# Patient Record
Sex: FEMALE | Race: WHITE | NOT HISPANIC OR LATINO | Employment: UNEMPLOYED | RURAL
[De-identification: names, ages, dates, MRNs, and addresses within clinical notes are randomized per-mention and may not be internally consistent; named-entity substitution may affect disease eponyms.]

---

## 2017-01-11 ENCOUNTER — OFFICE VISIT (OUTPATIENT)
Dept: RETAIL CLINIC | Facility: CLINIC | Age: 37
End: 2017-01-11

## 2017-01-11 VITALS
HEIGHT: 63 IN | BODY MASS INDEX: 18.61 KG/M2 | RESPIRATION RATE: 16 BRPM | TEMPERATURE: 98.5 F | WEIGHT: 105 LBS | OXYGEN SATURATION: 95 %

## 2017-01-11 DIAGNOSIS — J01.00 ACUTE NON-RECURRENT MAXILLARY SINUSITIS: Primary | ICD-10-CM

## 2017-01-11 PROCEDURE — 99213 OFFICE O/P EST LOW 20 MIN: CPT | Performed by: NURSE PRACTITIONER

## 2017-01-11 RX ORDER — AZITHROMYCIN 250 MG/1
TABLET, FILM COATED ORAL
Qty: 6 TABLET | Refills: 0 | Status: SHIPPED | OUTPATIENT
Start: 2017-01-11 | End: 2017-04-06

## 2017-01-11 NOTE — PROGRESS NOTES
"Subjective   Donna London is a 36 y.o. female.   Visit Vitals   • Temp 98.5 °F (36.9 °C)   • Resp 16   • Ht 63\" (160 cm)   • Wt 105 lb (47.6 kg)   • SpO2 95%   • BMI 18.6 kg/m2         URI    This is a new problem. The current episode started in the past 7 days. The problem has been rapidly worsening. The maximum temperature recorded prior to her arrival was 101 - 101.9 F. Associated symptoms include chest pain, congestion, coughing (productive), headaches, a plugged ear sensation, rhinorrhea, sinus pain, a sore throat and wheezing (severe). Pertinent negatives include no diarrhea, dysuria, nausea or rash.      PMH of COPD and recurrent bronchitis.      The following portions of the patient's history were reviewed and updated as appropriate: allergies, current medications, past family history, past medical history, past social history, past surgical history and problem list.    Review of Systems   HENT: Positive for congestion, rhinorrhea and sore throat.    Respiratory: Positive for cough (productive) and wheezing (severe).    Cardiovascular: Positive for chest pain.   Gastrointestinal: Negative for diarrhea and nausea.   Genitourinary: Negative for dysuria.   Skin: Negative for rash.   Neurological: Positive for headaches.       Objective   Physical Exam   Constitutional: She appears well-developed and well-nourished. She appears ill.   HENT:   Head: Normocephalic and atraumatic.   Right Ear: Tympanic membrane and ear canal normal.   Left Ear: Tympanic membrane and ear canal normal.   Nose: Mucosal edema and rhinorrhea present. Right sinus exhibits maxillary sinus tenderness. Left sinus exhibits maxillary sinus tenderness.   Mouth/Throat: No posterior oropharyngeal edema or posterior oropharyngeal erythema.   Tongue is slighted coated   Cardiovascular: Regular rhythm and normal heart sounds.    Pulmonary/Chest: Effort normal. She has no wheezes. She has no rhonchi. She has no rales.   Lymphadenopathy:     She " has no cervical adenopathy.   Skin: Skin is warm and dry.       Assessment/Plan   Diagnoses and all orders for this visit:    Acute non-recurrent maxillary sinusitis    Other orders  -     azithromycin (ZITHROMAX Z-SHOAIB) 250 MG tablet; Take 2 tablets the first day, then 1 tablet daily for 4 days.

## 2017-04-06 ENCOUNTER — OFFICE VISIT (OUTPATIENT)
Dept: RETAIL CLINIC | Facility: CLINIC | Age: 37
End: 2017-04-06

## 2017-04-06 VITALS
HEART RATE: 74 BPM | SYSTOLIC BLOOD PRESSURE: 105 MMHG | TEMPERATURE: 98.5 F | DIASTOLIC BLOOD PRESSURE: 70 MMHG | OXYGEN SATURATION: 99 % | WEIGHT: 103 LBS | BODY MASS INDEX: 18.25 KG/M2 | HEIGHT: 63 IN

## 2017-04-06 DIAGNOSIS — K04.7 DENTAL ABSCESS: Primary | ICD-10-CM

## 2017-04-06 DIAGNOSIS — J06.9 UPPER RESPIRATORY INFECTION, ACUTE: ICD-10-CM

## 2017-04-06 PROCEDURE — 99213 OFFICE O/P EST LOW 20 MIN: CPT | Performed by: NURSE PRACTITIONER

## 2017-04-06 RX ORDER — ALBUTEROL SULFATE 90 UG/1
2 AEROSOL, METERED RESPIRATORY (INHALATION) EVERY 4 HOURS PRN
Qty: 1 INHALER | Refills: 0 | Status: SHIPPED | OUTPATIENT
Start: 2017-04-06 | End: 2019-06-26

## 2017-04-06 RX ORDER — CLARITHROMYCIN 500 MG/1
500 TABLET, COATED ORAL 2 TIMES DAILY
Qty: 20 TABLET | Refills: 0 | Status: SHIPPED | OUTPATIENT
Start: 2017-04-06 | End: 2017-04-16

## 2017-04-06 RX ORDER — FLUTICASONE PROPIONATE 50 MCG
2 SPRAY, SUSPENSION (ML) NASAL DAILY
Qty: 1 EACH | Refills: 0 | Status: SHIPPED | OUTPATIENT
Start: 2017-04-06 | End: 2017-05-06

## 2017-04-06 NOTE — PROGRESS NOTES
Subjective   Donna London is a 36 y.o. female presents with the following:    Earache    There is pain in the left ear. This is a new problem. The current episode started yesterday. The problem occurs constantly. The problem has been gradually worsening. There has been no fever. The pain is moderate. Associated symptoms include coughing and headaches. Pertinent negatives include no ear discharge, rhinorrhea or sore throat. She has tried acetaminophen and NSAIDs for the symptoms. The treatment provided mild relief.   Dental Pain    This is a recurrent problem. The current episode started in the past 7 days. The problem occurs constantly. The problem has been gradually worsening. The pain is moderate. Associated symptoms include facial pain and sinus pressure. She has tried acetaminophen and NSAIDs for the symptoms. The treatment provided mild relief.   Patient recently in the clinic a couple months ago for a dental abcess. She reports that she has had dental work performed since that visit. She is scheduled for additional dental work on 04/17.    The following portions of the patient's history were reviewed and updated as appropriate: allergies, current medications, past family history, past medical history, past social history, past surgical history and problem list.    Review of Systems   Constitutional: Positive for appetite change.   HENT: Positive for congestion, dental problem (left upper molar), ear pain, postnasal drip, sinus pressure and tinnitus. Negative for ear discharge, rhinorrhea, sneezing and sore throat.    Eyes: Negative.    Respiratory: Positive for cough, shortness of breath and wheezing.    Cardiovascular: Negative.    Gastrointestinal: Negative.    Skin: Negative.    Neurological: Positive for headaches.   Psychiatric/Behavioral: The patient is nervous/anxious.        Objective   Physical Exam   Constitutional: She is oriented to person, place, and time. She appears well-developed and  well-nourished.   HENT:   Head: Normocephalic and atraumatic.   Right Ear: Hearing, tympanic membrane, external ear and ear canal normal.   Left Ear: Hearing, external ear and ear canal normal. A middle ear effusion is present.   Nose: Nose normal.   Mouth/Throat: Uvula is midline and mucous membranes are normal. Posterior oropharyngeal erythema present.   Eyes: Pupils are equal, round, and reactive to light.   Neck: Normal range of motion.   Cardiovascular: Normal rate, regular rhythm and normal heart sounds.    No murmur heard.  Pulmonary/Chest: Effort normal. She has decreased breath sounds in the right lower field and the left lower field. She has wheezes in the right upper field and the right middle field.   Musculoskeletal: Normal range of motion.   Neurological: She is alert and oriented to person, place, and time.   Skin: Skin is warm and dry.   Vitals reviewed.      Assessment/Plan   Donna was seen today for earache and dental pain.    Diagnoses and all orders for this visit:    Dental abscess  -     clarithromycin (BIAXIN) 500 MG tablet; Take 1 tablet by mouth 2 (Two) Times a Day for 10 days.    Upper respiratory infection, acute  -     fluticasone (FLONASE) 50 MCG/ACT nasal spray; 2 sprays into each nostril Daily for 30 days.  -     albuterol (PROVENTIL HFA;VENTOLIN HFA) 108 (90 BASE) MCG/ACT inhaler; Inhale 2 puffs Every 4 (Four) Hours As Needed for Wheezing or Shortness of Air.       Reviewed home care instructions, and patient verbalized understanding. Patient instructed to follow up with PCP and/or ED for worsening or non-resolving symptoms.     JAN Zhang

## 2017-04-06 NOTE — PATIENT INSTRUCTIONS
"Upper Respiratory Infection, Adult  Most upper respiratory infections (URIs) are a viral infection of the air passages leading to the lungs. A URI affects the nose, throat, and upper air passages. The most common type of URI is nasopharyngitis and is typically referred to as \"the common cold.\"  URIs run their course and usually go away on their own. Most of the time, a URI does not require medical attention, but sometimes a bacterial infection in the upper airways can follow a viral infection. This is called a secondary infection. Sinus and middle ear infections are common types of secondary upper respiratory infections.  Bacterial pneumonia can also complicate a URI. A URI can worsen asthma and chronic obstructive pulmonary disease (COPD). Sometimes, these complications can require emergency medical care and may be life threatening.   CAUSES  Almost all URIs are caused by viruses. A virus is a type of germ and can spread from one person to another.   RISKS FACTORS  You may be at risk for a URI if:   · You smoke.    · You have chronic heart or lung disease.  · You have a weakened defense (immune) system.    · You are very young or very old.    · You have nasal allergies or asthma.  · You work in crowded or poorly ventilated areas.  · You work in health care facilities or schools.  SIGNS AND SYMPTOMS   Symptoms typically develop 2-3 days after you come in contact with a cold virus. Most viral URIs last 7-10 days. However, viral URIs from the influenza virus (flu virus) can last 14-18 days and are typically more severe. Symptoms may include:   · Runny or stuffy (congested) nose.    · Sneezing.    · Cough.    · Sore throat.    · Headache.    · Fatigue.    · Fever.    · Loss of appetite.    · Pain in your forehead, behind your eyes, and over your cheekbones (sinus pain).  · Muscle aches.    DIAGNOSIS   Your health care provider may diagnose a URI by:  · Physical exam.  · Tests to check that your symptoms are not due to " another condition such as:  ¨ Strep throat.  ¨ Sinusitis.  ¨ Pneumonia.  ¨ Asthma.  TREATMENT   A URI goes away on its own with time. It cannot be cured with medicines, but medicines may be prescribed or recommended to relieve symptoms. Medicines may help:  · Reduce your fever.  · Reduce your cough.  · Relieve nasal congestion.  HOME CARE INSTRUCTIONS   · Take medicines only as directed by your health care provider.    · Gargle warm saltwater or take cough drops to comfort your throat as directed by your health care provider.  · Use a warm mist humidifier or inhale steam from a shower to increase air moisture. This may make it easier to breathe.  · Drink enough fluid to keep your urine clear or pale yellow.    · Eat soups and other clear broths and maintain good nutrition.    · Rest as needed.    · Return to work when your temperature has returned to normal or as your health care provider advises. You may need to stay home longer to avoid infecting others. You can also use a face mask and careful hand washing to prevent spread of the virus.  · Increase the usage of your inhaler if you have asthma.    · Do not use any tobacco products, including cigarettes, chewing tobacco, or electronic cigarettes. If you need help quitting, ask your health care provider.  PREVENTION   The best way to protect yourself from getting a cold is to practice good hygiene.   · Avoid oral or hand contact with people with cold symptoms.    · Wash your hands often if contact occurs.    There is no clear evidence that vitamin C, vitamin E, echinacea, or exercise reduces the chance of developing a cold. However, it is always recommended to get plenty of rest, exercise, and practice good nutrition.   SEEK MEDICAL CARE IF:   · You are getting worse rather than better.    · Your symptoms are not controlled by medicine.    · You have chills.  · You have worsening shortness of breath.  · You have brown or red mucus.  · You have yellow or brown nasal  discharge.  · You have pain in your face, especially when you bend forward.  · You have a fever.  · You have swollen neck glands.  · You have pain while swallowing.  · You have white areas in the back of your throat.  SEEK IMMEDIATE MEDICAL CARE IF:   · You have severe or persistent:    Headache.    Ear pain.    Sinus pain.    Chest pain.  · You have chronic lung disease and any of the following:    Wheezing.    Prolonged cough.    Coughing up blood.    A change in your usual mucus.  · You have a stiff neck.  · You have changes in your:    Vision.    Hearing.    Thinking.    Mood.  MAKE SURE YOU:   · Understand these instructions.  · Will watch your condition.  · Will get help right away if you are not doing well or get worse.     This information is not intended to replace advice given to you by your health care provider. Make sure you discuss any questions you have with your health care provider.     Document Released: 06/13/2002 Document Revised: 05/03/2016 Document Reviewed: 03/25/2015  [employer] Interactive Patient Education ©2016 [employer]  Dental Abscess  A dental abscess is a collection of pus in or around a tooth.  CAUSES  This condition is caused by a bacterial infection around the root of the tooth that involves the inner part of the tooth (pulp). It may result from:  · Severe tooth decay.  · Trauma to the tooth that allows bacteria to enter into the pulp, such as a broken or chipped tooth.  · Severe gum disease around a tooth.  SYMPTOMS  Symptoms of this condition include:  · Severe pain in and around the infected tooth.  · Swelling and redness around the infected tooth, in the mouth, or in the face.  · Tenderness.  · Pus drainage.  · Bad breath.  · Bitter taste in the mouth.  · Difficulty swallowing.  · Difficulty opening the mouth.  · Nausea.  · Vomiting.  · Chills.  · Swollen neck glands.  · Fever.  DIAGNOSIS  This condition is diagnosed with examination of the infected tooth. During the exam, your  dentist may tap on the infected tooth. Your dentist will also ask about your medical and dental history and may order X-rays.  TREATMENT  This condition is treated by eliminating the infection. This may be done with:  · Antibiotic medicine.  · A root canal. This may be performed to save the tooth.  · Pulling (extracting) the tooth. This may also involve draining the abscess. This is done if the tooth cannot be saved.  HOME CARE INSTRUCTIONS  · Take medicines only as directed by your dentist.  · If you were prescribed antibiotic medicine, finish all of it even if you start to feel better.  · Rinse your mouth (gargle) often with salt water to relieve pain or swelling.  · Do not drive or operate heavy machinery while taking pain medicine.  · Do not apply heat to the outside of your mouth.  · Keep all follow-up visits as directed by your dentist. This is important.  SEEK MEDICAL CARE IF:  · Your pain is worse and is not helped by medicine.  SEEK IMMEDIATE MEDICAL CARE IF:  · You have a fever or chills.  · Your symptoms suddenly get worse.  · You have a very bad headache.  · You have problems breathing or swallowing.  · You have trouble opening your mouth.  · You have swelling in your neck or around your eye.     This information is not intended to replace advice given to you by your health care provider. Make sure you discuss any questions you have with your health care provider.     Document Released: 12/18/2006 Document Revised: 05/03/2016 Document Reviewed: 12/15/2015  [employer] Interactive Patient Education ©2016 [employer]

## 2017-09-08 ENCOUNTER — OFFICE VISIT (OUTPATIENT)
Dept: RETAIL CLINIC | Facility: CLINIC | Age: 37
End: 2017-09-08

## 2017-09-08 VITALS
BODY MASS INDEX: 17.72 KG/M2 | HEIGHT: 63 IN | TEMPERATURE: 98.6 F | HEART RATE: 72 BPM | RESPIRATION RATE: 20 BRPM | OXYGEN SATURATION: 99 % | WEIGHT: 100 LBS

## 2017-09-08 DIAGNOSIS — M54.2 NECK PAIN, MUSCULOSKELETAL: Primary | ICD-10-CM

## 2017-09-08 DIAGNOSIS — H92.02 OTALGIA, LEFT: ICD-10-CM

## 2017-09-08 PROCEDURE — 99213 OFFICE O/P EST LOW 20 MIN: CPT | Performed by: NURSE PRACTITIONER

## 2017-09-08 RX ORDER — LORATADINE 10 MG/1
10 TABLET ORAL DAILY
Qty: 30 TABLET | Refills: 0 | Status: SHIPPED | OUTPATIENT
Start: 2017-09-08 | End: 2017-10-08

## 2017-09-08 RX ORDER — IBUPROFEN 600 MG/1
600 TABLET ORAL EVERY 8 HOURS PRN
Qty: 30 TABLET | Refills: 0 | Status: SHIPPED | OUTPATIENT
Start: 2017-09-08 | End: 2017-09-18

## 2017-09-08 NOTE — PATIENT INSTRUCTIONS
Muscle Pain, Adult  Muscle pain (myalgia) may be caused by many things, including:  · Overuse or muscle strain, especially if you are not in shape. This is the most common cause of muscle pain.  · Injury.  · Bruises.  · Viruses, such as the flu.  · Infectious diseases.  · Fibromyalgia, which is a chronic condition that causes muscle tenderness, fatigue, and headache.  · Autoimmune diseases, including lupus.  · Certain drugs, including ACE inhibitors and statins.  Muscle pain may be mild or severe. In most cases, the pain lasts only a short time and goes away without treatment. To diagnose the cause of your muscle pain, your health care provider will take your medical history. This means he or she will ask you when your muscle pain began and what has been happening. If you have not had muscle pain for very long, your health care provider may want to wait before doing much testing. If your muscle pain has lasted a long time, your health care provider may want to run tests right away. If your health care provider thinks your muscle pain may be caused by illness, you may need to have additional tests to rule out certain conditions.   Treatment for muscle pain depends on the cause. Home care is often enough to relieve muscle pain. Your health care provider may also prescribe anti-inflammatory medicine.  HOME CARE INSTRUCTIONS  Watch your condition for any changes. The following actions may help to lessen any discomfort you are feeling:  · Only take over-the-counter or prescription medicines as directed by your health care provider.  · Apply ice to the sore muscle:    Put ice in a plastic bag.    Place a towel between your skin and the bag.    Leave the ice on for 15-20 minutes, 3-4 times a day.  · You may alternate applying hot and cold packs to the muscle as directed by your health care provider.  · If overuse is causing your muscle pain, slow down your activities until the pain goes away.    Remember that it is normal  to feel some muscle pain after starting a workout program. Muscles that have not been used often will be sore at first.    Do regular, gentle exercises if you are not usually active.    Warm up before exercising to lower your risk of muscle pain.  · Do not continue working out if the pain is very bad. Bad pain could mean you have injured a muscle.  SEEK MEDICAL CARE IF:  · Your muscle pain gets worse, and medicines do not help.  · You have muscle pain that lasts longer than 3 days.  · You have a rash or fever along with muscle pain.  · You have muscle pain after a tick bite.  · You have muscle pain while working out, even though you are in good physical condition.  · You have redness, soreness, or swelling along with muscle pain.  · You have muscle pain after starting a new medicine or changing the dose of a medicine.  SEEK IMMEDIATE MEDICAL CARE IF:  · You have trouble breathing.  · You have trouble swallowing.  · You have muscle pain along with a stiff neck, fever, and vomiting.  · You have severe muscle weakness or cannot move part of your body.  MAKE SURE YOU:   · Understand these instructions.  · Will watch your condition.  · Will get help right away if you are not doing well or get worse.     This information is not intended to replace advice given to you by your health care provider. Make sure you discuss any questions you have with your health care provider.     Document Released: 11/09/2007 Document Revised: 04/10/2017 Document Reviewed: 10/14/2014  [employer] Interactive Patient Education ©2017 [employer]  Earache  An earache, also called otalgia, can be caused by many things. Pain from an earache can be sharp, dull, or burning. The pain may be temporary or constant.  Earaches can be caused by problems with the ear, such as infection in either the middle ear or the ear canal, injury, impacted ear wax, middle ear pressure, or a foreign body in the ear. Ear pain can also result from problems in other areas.  This is called referred pain. For example, pain can come from a sore throat, a tooth infection, or problems with the jaw or the joint between the jaw and the skull (temporomandibular joint, or TMJ).  The cause of an earache is not always easy to identify. Watchful waiting may be appropriate for some earaches until a clear cause of the pain can be found.  HOME CARE INSTRUCTIONS  Watch your condition for any changes. The following actions may help to lessen any discomfort that you are feeling:  · Take medicines only as directed by your health care provider. This includes ear drops.  · Apply ice to your outer ear to help reduce pain.    Put ice in a plastic bag.    Place a towel between your skin and the bag.    Leave the ice on for 20 minutes, 2-3 times per day.  · Do not put anything in your ear other than medicine that is prescribed by your health care provider.  · Try resting in an upright position instead of lying down. This may help to reduce pressure in the middle ear and relieve pain.  · Chew gum if it helps to relieve your ear pain.  · Control any allergies that you have.  · Keep all follow-up visits as directed by your health care provider. This is important.  SEEK MEDICAL CARE IF:  · Your pain does not improve within 2 days.  · You have a fever.  · You have new or worsening symptoms.  SEEK IMMEDIATE MEDICAL CARE IF:  · You have a severe headache.  · You have a stiff neck.  · You have difficulty swallowing.  · You have redness or swelling behind your ear.  · You have drainage from your ear.  · You have hearing loss.  · You feel dizzy.     This information is not intended to replace advice given to you by your health care provider. Make sure you discuss any questions you have with your health care provider.      You Can Quit Smoking  If you are ready to quit smoking or are thinking about it, congratulations! You have chosen to help yourself be healthier and live longer! There are lots of different ways to quit  "smoking. Nicotine gum, nicotine patches, a nicotine inhaler, or nicotine nasal spray can help with physical craving. Hypnosis, support groups, and medicines help break the habit of smoking.  TIPS TO GET OFF AND STAY OFF CIGARETTES  · Learn to predict your moods. Do not let a bad situation be your excuse to have a cigarette. Some situations in your life might tempt you to have a cigarette.  · Ask friends and co-workers not to smoke around you.  · Make your home smoke-free.  · Never have \"just one\" cigarette. It leads to wanting another and another. Remind yourself of your decision to quit.  · On a card, make a list of your reasons for not smoking. Read it at least the same number of times a day as you have a cigarette. Tell yourself everyday, \"I do not want to smoke. I choose not to smoke.\"  · Ask someone at home or work to help you with your plan to quit smoking.  · Have something planned after you eat or have a cup of coffee. Take a walk or get other exercise to perk you up. This will help to keep you from overeating.  · Try a relaxation exercise to calm you down and decrease your stress. Remember, you may be tense and nervous the first two weeks after you quit. This will pass.  · Find new activities to keep your hands busy. Play with a pen, coin, or rubber band. Doodle or draw things on paper.  · Brush your teeth right after eating. This will help cut down the craving for the taste of tobacco after meals. You can try mouthwash too.  · Try gum, breath mints, or diet candy to keep something in your mouth.  IF YOU SMOKE AND WANT TO QUIT:  · Do not stock up on cigarettes. Never buy a carton. Wait until one pack is finished before you buy another.  · Never carry cigarettes with you at work or at home.  · Keep cigarettes as far away from you as possible. Leave them with someone else.  · Never carry matches or a lighter with you.  · Ask yourself, \"Do I need this cigarette or is this just a reflex?\"  · Bet with someone " that you can quit. Put cigarette money in a [employer] bank every morning. If you smoke, you give up the money. If you do not smoke, by the end of the week, you keep the money.  · Keep trying. It takes 21 days to change a habit!  · Talk to your doctor about using medicines to help you quit. These include nicotine replacement gum, lozenges, or skin patches.     This information is not intended to replace advice given to you by your health care provider. Make sure you discuss any questions you have with your health care provider.     Document Released: 10/14/2010 Document Revised: 03/11/2013 Document Reviewed: 05/03/2016  [employer] Interactive Patient Education ©2017 [employer]       Document Released: 08/04/2005 Document Revised: 01/08/2016 Document Reviewed: 07/19/2015  [employer] Interactive Patient Education ©2017 [employer]

## 2017-09-08 NOTE — PROGRESS NOTES
Subjective   Donna London is a 37 y.o. female presents for left ear pain/fullness and neck pain/pressure.  States that neck muscle stiffness and ear problem has been ongoing intermittently for about one year.  States worse in last couple of days.   has a history of TMJ.   has been really stressed and had dental work yesterday.  Thinks stress is causing neck to hurt from tension.  States ear popping and feels like fluid.   has doxycycline for ear that she has been taking but doesn't seem to be helping.     Ear Fullness    There is pain in the left ear. This is a recurrent problem. The current episode started more than 1 month ago. The problem has been waxing and waning. There has been no fever. The pain is at a severity of 4/10. Associated symptoms include headaches (intermittent ) and neck pain. Pertinent negatives include no abdominal pain, coughing, diarrhea, ear discharge, hearing loss, rhinorrhea or sore throat. She has tried antibiotics (advil ) for the symptoms. The treatment provided mild relief.   Neck Pain    This is a recurrent problem. The current episode started more than 1 month ago. The problem has been waxing and waning. Associated with: stress. Pain location: upper back/lower neck. The quality of the pain is described as aching. The pain is at a severity of 4/10. Associated symptoms include headaches (intermittent ). Pertinent negatives include no chest pain, fever, leg pain, numbness, pain with swallowing, paresis, photophobia, syncope, tingling, trouble swallowing, visual change, weakness or weight loss. She has tried NSAIDs for the symptoms. The treatment provided mild relief.        The following portions of the patient's history were reviewed and updated as appropriate: allergies, current medications, past family history, past medical history, past social history and past surgical history.    Review of Systems   Constitutional: Negative for chills, fever, unexpected weight  change and weight loss.   HENT: Positive for dental problem (states that she had several teeth filled yesterday,  denies pain or drainage) and ear pain (bilat ear pressure and pain). Negative for congestion, drooling, ear discharge, hearing loss, postnasal drip, rhinorrhea, sinus pressure, sneezing, sore throat, trouble swallowing and voice change.    Eyes: Negative.  Negative for photophobia.   Respiratory: Negative.  Negative for cough, chest tightness and wheezing.    Cardiovascular: Negative.  Negative for chest pain and syncope.   Gastrointestinal: Negative.  Negative for abdominal pain and diarrhea.   Genitourinary: Negative.    Musculoskeletal: Positive for neck pain and neck stiffness (thinks muscle stiffness and tension related to TMJ, dental work and stress).   Skin: Negative.    Allergic/Immunologic: Negative.    Neurological: Positive for headaches (intermittent ). Negative for tingling, syncope, weakness, light-headedness and numbness.       Objective   Physical Exam   Constitutional: She is oriented to person, place, and time. She appears well-developed and well-nourished. No distress.   HENT:   Head: Normocephalic and atraumatic.   Right Ear: A middle ear effusion is present.   Left Ear: A middle ear effusion is present.   Nose: Mucosal edema present.   Mouth/Throat: Uvula is midline, oropharynx is clear and moist and mucous membranes are normal. Tonsils are 0 on the right. Tonsils are 0 on the left. No tonsillar exudate.   Eyes: Lids are normal. Pupils are equal, round, and reactive to light.   Neck: Normal range of motion. Muscular tenderness present. No edema and no erythema present.       Cardiovascular: Normal rate, regular rhythm and normal heart sounds.    No murmur heard.  Pulmonary/Chest: Effort normal and breath sounds normal. No respiratory distress.   Lymphadenopathy:     She has no cervical adenopathy.   Neurological: She is alert and oriented to person, place, and time.   Skin: Skin is  warm and dry.   Psychiatric: She has a normal mood and affect. Her speech is normal and behavior is normal. Thought content normal.       Assessment/Plan   Donna was seen today for ear fullness and neck pain.    Diagnoses and all orders for this visit:    Neck pain, musculoskeletal  Otalgia, left  -     loratadine (CLARITIN) 10 MG tablet; Take 1 tablet by mouth Daily for 30 days.  -     ibuprofen (ADVIL,MOTRIN) 600 MG tablet; Take 1 tablet by mouth Every 8 (Eight) Hours As Needed for Mild Pain  or Moderate Pain  for up to 10 days.      Medications and plan of care reviewed with patient and teaching done on med reactions/side effects.  Rest, plenty of fluids, comfort measures, heating pad to neck as discussed.  Take Claritin and Ibuprofen as discussed.  Encouraged to quit smoking and to utilize resources as discussed.  If symptoms persist follow up with PCP and if worse go to urgent care or ER as discussed.  Patient verbalized understanding.    JAN Hinton

## 2017-11-13 ENCOUNTER — HOSPITAL ENCOUNTER (OUTPATIENT)
Dept: LAB | Facility: MEDICAL CENTER | Age: 37
End: 2017-11-13
Attending: FAMILY MEDICINE
Payer: COMMERCIAL

## 2017-11-13 LAB
HIV 1+2 AB+HIV1 P24 AG SERPL QL IA: NON REACTIVE
TREPONEMA PALLIDUM IGG+IGM AB [PRESENCE] IN SERUM OR PLASMA BY IMMUNOASSAY: NON REACTIVE

## 2017-11-13 PROCEDURE — 36415 COLL VENOUS BLD VENIPUNCTURE: CPT

## 2017-11-13 PROCEDURE — 87389 HIV-1 AG W/HIV-1&-2 AB AG IA: CPT

## 2017-11-13 PROCEDURE — 86780 TREPONEMA PALLIDUM: CPT

## 2018-03-13 DIAGNOSIS — Z01.812 PRE-OPERATIVE LABORATORY EXAMINATION: ICD-10-CM

## 2018-03-13 PROCEDURE — 83036 HEMOGLOBIN GLYCOSYLATED A1C: CPT

## 2018-03-13 PROCEDURE — 84703 CHORIONIC GONADOTROPIN ASSAY: CPT

## 2018-03-13 PROCEDURE — 36415 COLL VENOUS BLD VENIPUNCTURE: CPT

## 2018-03-13 NOTE — OR NURSING
PreAdmit Appointment: Patient given Preparing for your procedure handout. Patient instructed to continue regularly prescribed medications through day before surgery. Instructed to take the following medications the day of surgery with a sip of water per Anesthesia protocol: None

## 2018-03-14 ENCOUNTER — HOSPITAL ENCOUNTER (OUTPATIENT)
Facility: MEDICAL CENTER | Age: 38
End: 2018-03-14
Attending: ORTHOPAEDIC SURGERY | Admitting: ORTHOPAEDIC SURGERY
Payer: COMMERCIAL

## 2018-03-14 VITALS
DIASTOLIC BLOOD PRESSURE: 77 MMHG | OXYGEN SATURATION: 98 % | SYSTOLIC BLOOD PRESSURE: 113 MMHG | TEMPERATURE: 97.9 F | BODY MASS INDEX: 31.45 KG/M2 | HEART RATE: 76 BPM | RESPIRATION RATE: 16 BRPM | WEIGHT: 200.4 LBS | HEIGHT: 67 IN

## 2018-03-14 DIAGNOSIS — G56.01 RIGHT CARPAL TUNNEL SYNDROME: ICD-10-CM

## 2018-03-14 LAB
EST. AVERAGE GLUCOSE BLD GHB EST-MCNC: 120 MG/DL
GLUCOSE BLD-MCNC: 106 MG/DL (ref 65–99)
HBA1C MFR BLD: 5.8 % (ref 0–5.6)
HCG SERPL QL: NEGATIVE

## 2018-03-14 PROCEDURE — 160029 HCHG SURGERY MINUTES - 1ST 30 MINS LEVEL 4: Performed by: ORTHOPAEDIC SURGERY

## 2018-03-14 PROCEDURE — 160025 RECOVERY II MINUTES (STATS): Performed by: ORTHOPAEDIC SURGERY

## 2018-03-14 PROCEDURE — 82962 GLUCOSE BLOOD TEST: CPT

## 2018-03-14 PROCEDURE — 501838 HCHG SUTURE GENERAL: Performed by: ORTHOPAEDIC SURGERY

## 2018-03-14 PROCEDURE — 160048 HCHG OR STATISTICAL LEVEL 1-5: Performed by: ORTHOPAEDIC SURGERY

## 2018-03-14 PROCEDURE — 700111 HCHG RX REV CODE 636 W/ 250 OVERRIDE (IP)

## 2018-03-14 PROCEDURE — 700101 HCHG RX REV CODE 250

## 2018-03-14 PROCEDURE — 700105 HCHG RX REV CODE 258: Performed by: ORTHOPAEDIC SURGERY

## 2018-03-14 PROCEDURE — 160009 HCHG ANES TIME/MIN: Performed by: ORTHOPAEDIC SURGERY

## 2018-03-14 PROCEDURE — 502576 HCHG PACK, HAND: Performed by: ORTHOPAEDIC SURGERY

## 2018-03-14 PROCEDURE — 160046 HCHG PACU - 1ST 60 MINS PHASE II: Performed by: ORTHOPAEDIC SURGERY

## 2018-03-14 PROCEDURE — A6454 SELF-ADHER BAND W>=3" <5"/YD: HCPCS | Performed by: ORTHOPAEDIC SURGERY

## 2018-03-14 RX ORDER — SODIUM CHLORIDE, SODIUM LACTATE, POTASSIUM CHLORIDE, CALCIUM CHLORIDE 600; 310; 30; 20 MG/100ML; MG/100ML; MG/100ML; MG/100ML
1000 INJECTION, SOLUTION INTRAVENOUS
Status: DISCONTINUED | OUTPATIENT
Start: 2018-03-14 | End: 2018-03-14 | Stop reason: HOSPADM

## 2018-03-14 RX ORDER — LIDOCAINE HYDROCHLORIDE AND EPINEPHRINE 10; 10 MG/ML; UG/ML
INJECTION, SOLUTION INFILTRATION; PERINEURAL
Status: DISCONTINUED | OUTPATIENT
Start: 2018-03-14 | End: 2018-03-14 | Stop reason: HOSPADM

## 2018-03-14 RX ORDER — TRAMADOL HYDROCHLORIDE 50 MG/1
50 TABLET ORAL EVERY 4 HOURS PRN
Qty: 27 TAB | Refills: 0 | Status: SHIPPED | OUTPATIENT
Start: 2018-03-14 | End: 2018-03-21

## 2018-03-14 RX ORDER — BUPIVACAINE HYDROCHLORIDE AND EPINEPHRINE 5; 5 MG/ML; UG/ML
INJECTION, SOLUTION EPIDURAL; INTRACAUDAL; PERINEURAL
Status: DISCONTINUED | OUTPATIENT
Start: 2018-03-14 | End: 2018-03-14 | Stop reason: HOSPADM

## 2018-03-14 RX ADMIN — SODIUM CHLORIDE, POTASSIUM CHLORIDE, SODIUM LACTATE AND CALCIUM CHLORIDE 1000 ML: 600; 310; 30; 20 INJECTION, SOLUTION INTRAVENOUS at 12:06

## 2018-03-14 ASSESSMENT — PAIN SCALES - GENERAL
PAINLEVEL_OUTOF10: 0

## 2018-03-14 NOTE — OR NURSING
1619 Pt arrival direct from OR meets stage II status criteria. Pt to PACU II from OR via gurney, respirations spontaneous and non-labored via room air. Right handsurgical sites clean, dry and intact, pt awake with no complaints of pain or nausea. Ice pack placed on right hand surgical sight, cap refill < 2 seconds to right fingers.     1630 Pt dressed and transferred to [facility]. Pt meets criteria for D/C home.     1648 Pt mother at bedside.     1655 Provided a copy and educated pt and family on discharge instructions.    1652 Pt discharged via wheelchair.

## 2018-03-14 NOTE — DISCHARGE INSTRUCTIONS
ACTIVITY: Rest and take it easy for the first 24 hours.  A responsible adult is recommended to remain with you during that time.  It is normal to feel sleepy.  We encourage you to not do anything that requires balance, judgment or coordination.    MILD FLU-LIKE SYMPTOMS ARE NORMAL. YOU MAY EXPERIENCE GENERALIZED MUSCLE ACHES, THROAT IRRITATION, HEADACHE AND/OR SOME NAUSEA.    FOR 24 HOURS DO NOT:  Drive, operate machinery or run household appliances.  Drink beer or alcoholic beverages.   Make important decisions or sign legal documents.    SPECIAL INSTRUCTIONS:     Keep dressings clean, dry and intact   No lifting anything heavier than 10 lbs with the operative hand   May remove dressings after 48 hrs   May begin showering and washing hands as normal after 48 hrs   Do not soak the wound   Keep hand elevated and apply ice as much as possible in the first three days   Do not operate a vehicle or machinery while taking narcotic pain medications   Return to clinic in 10-14 days   Please call the office with any questions [phone]    DIET: To avoid nausea, slowly advance diet as tolerated, avoiding spicy or greasy foods for the first day.  Add more substantial food to your diet according to your physician's instructions.  Babies can be fed formula or breast milk as soon as they are hungry.  INCREASE FLUIDS AND FIBER TO AVOID CONSTIPATION.    FOLLOW-UP APPOINTMENT:  A follow-up appointment should be arranged with your doctor in 10-14 days; call to schedule.    You should CALL YOUR PHYSICIAN if you develop:  Fever greater than 101 degrees F.  Pain not relieved by medication, or persistent nausea or vomiting.  Excessive bleeding (blood soaking through dressing) or unexpected drainage from the wound.  Extreme redness or swelling around the incision site, drainage of pus or foul smelling drainage.  Inability to urinate or empty your bladder within 8 hours - return to ER.  Problems with breathing or chest pain - call  911.    You should call 911 if you develop problems with breathing or chest pain.  If you are unable to contact your doctor or surgical center, you should go to the nearest emergency room or urgent care center. Dr. Perez's telephone #: [phone].     If any questions arise, call your doctor.  If your doctor is not available, please feel free to call the Surgical Center at [phone].  The Center is open Monday through Friday from 7AM to 7PM.  You can also call the HEALTH HOTLINE open 24 hours/day, 7 days/week and speak to a nurse at [phone], or toll free at [phone].    A registered nurse may call you a few days after your surgery to see how you are doing after your procedure.    MEDICATIONS: Resume taking daily medication.  Take prescribed pain medication with food.  If no medication is prescribed, you may take non-aspirin pain medication if needed.  PAIN MEDICATION CAN BE VERY CONSTIPATING.  Take a stool softener or laxative such as senokot, pericolace, or milk of magnesia if needed.    Prescription given for Ultram.  Last pain medication given at none given.    If your physician has prescribed pain medication that includes Acetaminophen (Tylenol), do not take additional Acetaminophen (Tylenol) while taking the prescribed medication.    Depression / Suicide Risk    As you are discharged from this [facility] facility, it is important to learn how to keep safe from harming yourself.    Recognize the warning signs:  · Abrupt changes in personality, positive or negative- including increase in energy   · Giving away possessions  · Change in eating patterns- significant weight changes-  positive or negative  · Change in sleeping patterns- unable to sleep or sleeping all the time   · Unwillingness or inability to communicate  · Depression  · Unusual sadness, discouragement and loneliness  · Talk of wanting to die  · Neglect of personal appearance   · Rebelliousness- reckless  behavior  · Withdrawal from people/activities they love  · Confusion- inability to concentrate     If you or a loved one observes any of these behaviors or has concerns about self-harm, here's what you can do:  · Talk about it- your feelings and reasons for harming yourself  · Remove any means that you might use to hurt yourself (examples: pills, rope, extension cords, firearm)  · Get professional help from the community (Mental Health, Substance Abuse, psychological counseling)  · Do not be alone:Call your Safe Contact- someone whom you trust who will be there for you.  · Call your local CRISIS HOTLINE 213-1631 or 817-065-2372  · Call your local Children's Mobile Crisis Response Team [place] (437) 526-1042 or www.[employer]  · Call the toll free National Suicide Prevention Hotlines   · National Suicide Prevention Lifeline 398-876-NSPH (6090)  · National Hope Line Network 800-SUICIDE (672-1146)

## 2018-03-15 NOTE — OP REPORT
DATE OF SURGERY:  3/14/2018     PREOPERATIVE DIAGNOSIS:  Right carpal tunnel syndrome.     POSTOPERATIVE DIAGNOSIS:  Right carpal tunnel syndrome.     SURGERY PERFORMED:  Right endoscopic carpal tunnel release.     SURGEON:  José Miguel Perez MD     ANESTHESIA:  Monitored anesthesia care with local.     ASSISTANT:  None     COMPLICATIONS:  None.     TOURNIQUET TIME:  3 min.     TOURNIQUET PRESSURE:  250 mmHg.    INDICATIONS FOR PROCEDURE:  Ms. Barrera has had    persistent right carpal tunnel syndrome after an open carpal tunnel release performed by a different surgeon several years prior. She has tried nonoperative    modalities including activity modifications, splints, NSAIDs, and these have    not resolved the symptoms.  After electrodiagnostic studies indicated right    carpal tunnel syndrome with evidence of compression at the wrist, the decision   was made to take her to the operative room for the above-mentioned procedure.     DESCRIPTION OF PROCEDURE:  On the day of surgery, Ms. Barrera was seen in the    preoperative area where informed consent was obtained with all risks and    benefits of the procedure explained and all questions answered.  She wished to   proceed with the surgery.  The proper site was marked. The patient was subsequently    taken to the operative room and placed in the supine position with all bony    prominences well padded.  A tourniquet was placed on the right upper    extremity.  It was then prepped and draped in the usual sterile fashion.     A timeout was performed with all persons and attendants agreeing on the proper   patient, proper surgical site, and proper surgery to be performed.     An esmarch was used to exsanguinate the right upper extremity and tourniquet    was insufflated to 250 mmHg.  Monitored anesthesia care was induced and 4 mL    of equal parts of 1% lidocaine with epinephrine and 0.5% bupivacaine with    epinephrine were injected just proximal to the wrist  crease.  A transverse    incision was made at the distal wrist crease from the ulnar border of the    palmaris longus ulnarly.  This was roughly 1 cm in length.  Sharp and blunt    dissection was taken down to the level of the forearm fascia.  Skin hooks were   then placed proximally and distally to help retract soft tissues.  The    forearm fascia was visualized and this was incised with a [place] blade.  The    forearm fascia was then retracted distally with a double skin hook.  The    Spatula provided in the [employer] Centerline Endoscope tray was then placed deep to the   transverse carpal ligament and the canal was prepped for the endoscope by placing    increasingly larger trocars x2.  Next, the endoscope was placed deep to the    transverse carpal ligament, which was visualized clearly.  The distal aspect    of the transverse carpal ligament was then incised and this was visualized to    ensure complete transection distally.  Once this was ensured, the blade was    then maintained elevated and the endoscope was brought proximally across the  transverse carpal ligament.  The entirety of the transverse carpal ligament was incised and the endoscope was removed.     The wound was irrigated with normal saline and closed with 4-0 nylon in a    horizontal mattress fashion.  The wound was then dressed with Xeroform, 4x4s,    sterile cast padding, and Coban dressing.  The tourniquet was deflated.  The patient    was subsequently taken to the PACU in good and stable condition.     DISPOSITION:  Plan for discharge to home on a regular diet.  Weightbearing  as tolerated to the operative extremity with the exception of a 10-pound    lifting restriction.  Bandages may be removed after 48 hours.  At that time may   begin showering and bathing as normal, but should not soak the wound.    Return to clinic in 10-14 days.  A prescription for tramadol 50 mg 1 tab  PO q4-6 hours p.r.n. pain.

## 2018-06-12 ENCOUNTER — HOSPITAL ENCOUNTER (OUTPATIENT)
Dept: LAB | Facility: MEDICAL CENTER | Age: 38
End: 2018-06-12
Attending: NURSE PRACTITIONER
Payer: COMMERCIAL

## 2018-06-12 LAB
ALBUMIN SERPL BCP-MCNC: 4.2 G/DL (ref 3.2–4.9)
ALBUMIN/GLOB SERPL: 1.2 G/DL
ALP SERPL-CCNC: 70 U/L (ref 30–99)
ALT SERPL-CCNC: 11 U/L (ref 2–50)
ANION GAP SERPL CALC-SCNC: 8 MMOL/L (ref 0–11.9)
AST SERPL-CCNC: 12 U/L (ref 12–45)
BASOPHILS # BLD AUTO: 0 % (ref 0–1.8)
BASOPHILS # BLD: 0 K/UL (ref 0–0.12)
BILIRUB SERPL-MCNC: 0.7 MG/DL (ref 0.1–1.5)
BUN SERPL-MCNC: 9 MG/DL (ref 8–22)
CALCIUM SERPL-MCNC: 9.3 MG/DL (ref 8.5–10.5)
CHLORIDE SERPL-SCNC: 104 MMOL/L (ref 96–112)
CO2 SERPL-SCNC: 26 MMOL/L (ref 20–33)
CREAT SERPL-MCNC: 0.87 MG/DL (ref 0.5–1.4)
EOSINOPHIL # BLD AUTO: 0.16 K/UL (ref 0–0.51)
EOSINOPHIL NFR BLD: 1.8 % (ref 0–6.9)
ERYTHROCYTE [DISTWIDTH] IN BLOOD BY AUTOMATED COUNT: 43 FL (ref 35.9–50)
GLOBULIN SER CALC-MCNC: 3.4 G/DL (ref 1.9–3.5)
GLUCOSE SERPL-MCNC: 103 MG/DL (ref 65–99)
HCT VFR BLD AUTO: 37.1 % (ref 37–47)
HGB BLD-MCNC: 11.1 G/DL (ref 12–16)
HYPOCHROMIA BLD QL SMEAR: ABNORMAL
LYMPHOCYTES # BLD AUTO: 2.21 K/UL (ref 1–4.8)
LYMPHOCYTES NFR BLD: 25.4 % (ref 22–41)
MANUAL DIFF BLD: NORMAL
MCH RBC QN AUTO: 22.8 PG (ref 27–33)
MCHC RBC AUTO-ENTMCNC: 29.9 G/DL (ref 33.6–35)
MCV RBC AUTO: 76.3 FL (ref 81.4–97.8)
METAMYELOCYTES NFR BLD MANUAL: 0.9 %
MONOCYTES # BLD AUTO: 0 K/UL (ref 0–0.85)
MONOCYTES NFR BLD AUTO: 0 % (ref 0–13.4)
MORPHOLOGY BLD-IMP: NORMAL
NEUTROPHILS # BLD AUTO: 6.26 K/UL (ref 2–7.15)
NEUTROPHILS NFR BLD: 71.9 % (ref 44–72)
NRBC # BLD AUTO: 0 K/UL
NRBC BLD-RTO: 0 /100 WBC
OVALOCYTES BLD QL SMEAR: NORMAL
PLATELET # BLD AUTO: 292 K/UL (ref 164–446)
PLATELET BLD QL SMEAR: NORMAL
PMV BLD AUTO: 11.2 FL (ref 9–12.9)
POIKILOCYTOSIS BLD QL SMEAR: NORMAL
POTASSIUM SERPL-SCNC: 3.9 MMOL/L (ref 3.6–5.5)
PROT SERPL-MCNC: 7.6 G/DL (ref 6–8.2)
RBC # BLD AUTO: 4.86 M/UL (ref 4.2–5.4)
RBC BLD AUTO: PRESENT
SODIUM SERPL-SCNC: 138 MMOL/L (ref 135–145)
WBC # BLD AUTO: 8.7 K/UL (ref 4.8–10.8)

## 2018-06-12 PROCEDURE — 36415 COLL VENOUS BLD VENIPUNCTURE: CPT

## 2018-06-12 PROCEDURE — 85027 COMPLETE CBC AUTOMATED: CPT

## 2018-06-12 PROCEDURE — 80053 COMPREHEN METABOLIC PANEL: CPT

## 2018-06-12 PROCEDURE — 85007 BL SMEAR W/DIFF WBC COUNT: CPT

## 2018-06-12 PROCEDURE — 83550 IRON BINDING TEST: CPT

## 2018-06-12 PROCEDURE — 83540 ASSAY OF IRON: CPT

## 2018-06-13 LAB
IRON SATN MFR SERPL: 7 % (ref 15–55)
IRON SERPL-MCNC: 32 UG/DL (ref 40–170)
TIBC SERPL-MCNC: 486 UG/DL (ref 250–450)

## 2018-09-24 ENCOUNTER — OFFICE VISIT (OUTPATIENT)
Dept: RETAIL CLINIC | Facility: CLINIC | Age: 38
End: 2018-09-24

## 2018-09-24 VITALS
HEIGHT: 63 IN | WEIGHT: 95.4 LBS | HEART RATE: 86 BPM | DIASTOLIC BLOOD PRESSURE: 68 MMHG | TEMPERATURE: 97.6 F | OXYGEN SATURATION: 98 % | SYSTOLIC BLOOD PRESSURE: 112 MMHG | RESPIRATION RATE: 14 BRPM | BODY MASS INDEX: 16.9 KG/M2

## 2018-09-24 DIAGNOSIS — J30.2 ACUTE SEASONAL ALLERGIC RHINITIS, UNSPECIFIED TRIGGER: Primary | ICD-10-CM

## 2018-09-24 PROCEDURE — 99213 OFFICE O/P EST LOW 20 MIN: CPT | Performed by: NURSE PRACTITIONER

## 2018-09-24 RX ORDER — LORATADINE 10 MG/1
10 TABLET ORAL DAILY
Qty: 30 TABLET | Refills: 5 | Status: SHIPPED | OUTPATIENT
Start: 2018-09-24 | End: 2018-10-24

## 2018-09-24 RX ORDER — FLUTICASONE PROPIONATE 50 MCG
SPRAY, SUSPENSION (ML) NASAL
COMMUNITY
Start: 2018-05-14 | End: 2018-09-24 | Stop reason: SDUPTHER

## 2018-09-24 RX ORDER — PSEUDOEPHEDRINE HCL 120 MG/1
120 TABLET, FILM COATED, EXTENDED RELEASE ORAL EVERY 12 HOURS
Qty: 20 TABLET | Refills: 0 | Status: SHIPPED | OUTPATIENT
Start: 2018-09-24 | End: 2018-10-04

## 2018-09-24 RX ORDER — FLUTICASONE PROPIONATE 50 MCG
2 SPRAY, SUSPENSION (ML) NASAL NIGHTLY
Qty: 1 BOTTLE | Refills: 5 | Status: SHIPPED | OUTPATIENT
Start: 2018-09-24 | End: 2018-10-24

## 2018-09-24 RX ORDER — LORATADINE 10 MG/1
TABLET ORAL
COMMUNITY
Start: 2018-05-14 | End: 2018-09-24 | Stop reason: SDUPTHER

## 2018-09-24 NOTE — PROGRESS NOTES
"Subjective   Donna Lodnon is a 38 y.o. female.     Sinus Problem   This is a new problem. Episode onset: 2 days. The problem has been gradually worsening since onset. There has been no fever. She is experiencing no pain. Associated symptoms include congestion, headaches, sinus pressure and sneezing. Pertinent negatives include no chills, coughing, ear pain, hoarse voice, neck pain, shortness of breath, sore throat or swollen glands. Past treatments include nothing.        The following portions of the patient's history were reviewed and updated as appropriate: allergies, current medications, past medical history, past social history, past surgical history and problem list.    Review of Systems   Constitutional: Negative for appetite change, chills and fever.   HENT: Positive for congestion, postnasal drip, rhinorrhea, sinus pressure and sneezing. Negative for ear pain, hoarse voice, sinus pain, sore throat and trouble swallowing.    Eyes: Negative.    Respiratory: Negative for cough, shortness of breath and wheezing.    Cardiovascular: Negative.    Gastrointestinal: Negative for abdominal pain, diarrhea, nausea and vomiting.   Musculoskeletal: Negative.  Negative for neck pain.   Skin: Negative.    Neurological: Positive for headaches. Negative for dizziness.   Hematological: Positive for adenopathy.        /68   Pulse 86   Temp 97.6 °F (36.4 °C) (Temporal Artery )   Resp 14   Ht 160 cm (63\")   Wt 43.3 kg (95 lb 6.4 oz)   LMP 09/01/2018   SpO2 98%   BMI 16.90 kg/m²     Objective   Physical Exam   Constitutional: She is oriented to person, place, and time. Vital signs are normal. She appears well-developed and well-nourished. No distress.   HENT:   Head: Normocephalic.   Right Ear: External ear and ear canal normal. No drainage, swelling or tenderness. Tympanic membrane is bulging. Tympanic membrane is not erythematous.   Left Ear: External ear and ear canal normal. No drainage, swelling or " tenderness. Tympanic membrane is bulging. Tympanic membrane is not erythematous.   Nose: Mucosal edema and rhinorrhea present. Right sinus exhibits no maxillary sinus tenderness and no frontal sinus tenderness. Left sinus exhibits no maxillary sinus tenderness and no frontal sinus tenderness.   Mouth/Throat: Uvula is midline, oropharynx is clear and moist and mucous membranes are normal. Tonsils are 0 on the right. Tonsils are 0 on the left. No tonsillar exudate.   Neck: Normal range of motion. Neck supple.   Cardiovascular: Normal rate, regular rhythm, S1 normal, S2 normal and normal heart sounds.    Pulmonary/Chest: Effort normal and breath sounds normal. No respiratory distress. She has no wheezes. She has no rhonchi. She has no rales.   Abdominal: Soft. Bowel sounds are normal. She exhibits no distension. There is no tenderness. There is no rebound and no guarding.   Lymphadenopathy:        Head (right side): No tonsillar adenopathy present.        Head (left side): No tonsillar adenopathy present.     She has no cervical adenopathy.   Neurological: She is alert and oriented to person, place, and time.   Skin: Skin is warm and dry. No rash noted. She is not diaphoretic.   Psychiatric: She has a normal mood and affect. Her speech is normal and behavior is normal. Thought content normal.   Vitals reviewed.      Assessment/Plan   Donna was seen today for sinus problem.    Diagnoses and all orders for this visit:    Acute seasonal allergic rhinitis, unspecified trigger  -     loratadine (CLARITIN) 10 MG tablet; Take 1 tablet by mouth Daily for 30 days.  -     fluticasone (FLONASE) 50 MCG/ACT nasal spray; 2 sprays into the nostril(s) as directed by provider Every Night for 30 days. Administer 2 sprays in each nostril for each dose.  -     pseudoephedrine (SUDAFED) 120 MG 12 hr tablet; Take 1 tablet by mouth Every 12 (Twelve) Hours for 10 days.

## 2019-02-21 ENCOUNTER — HOSPITAL ENCOUNTER (OUTPATIENT)
Dept: RADIOLOGY | Facility: MEDICAL CENTER | Age: 39
End: 2019-02-21
Attending: COLON & RECTAL SURGERY
Payer: COMMERCIAL

## 2019-02-21 DIAGNOSIS — R13.10 PROBLEMS WITH SWALLOWING AND MASTICATION: ICD-10-CM

## 2019-02-21 DIAGNOSIS — K21.9 GASTROESOPHAGEAL REFLUX DISEASE, ESOPHAGITIS PRESENCE NOT SPECIFIED: ICD-10-CM

## 2019-02-21 PROCEDURE — 74241 DX-UPPER GI-SERIES WITH KUB: CPT

## 2019-06-26 ENCOUNTER — OFFICE VISIT (OUTPATIENT)
Dept: RETAIL CLINIC | Facility: CLINIC | Age: 39
End: 2019-06-26

## 2019-06-26 VITALS
BODY MASS INDEX: 16.87 KG/M2 | SYSTOLIC BLOOD PRESSURE: 100 MMHG | TEMPERATURE: 98.6 F | DIASTOLIC BLOOD PRESSURE: 80 MMHG | RESPIRATION RATE: 12 BRPM | OXYGEN SATURATION: 98 % | HEIGHT: 63 IN | HEART RATE: 88 BPM | WEIGHT: 95.2 LBS

## 2019-06-26 DIAGNOSIS — J02.9 SORETHROAT: ICD-10-CM

## 2019-06-26 DIAGNOSIS — B00.2 ORAL HERPES: Primary | ICD-10-CM

## 2019-06-26 LAB
EXPIRATION DATE: NORMAL
INTERNAL CONTROL: NORMAL
Lab: NORMAL
S PYO AG THROAT QL: NEGATIVE

## 2019-06-26 PROCEDURE — 87880 STREP A ASSAY W/OPTIC: CPT | Performed by: NURSE PRACTITIONER

## 2019-06-26 PROCEDURE — 99213 OFFICE O/P EST LOW 20 MIN: CPT | Performed by: NURSE PRACTITIONER

## 2019-06-26 RX ORDER — BUPRENORPHINE AND NALOXONE 8; 2 MG/1; MG/1
FILM, SOLUBLE BUCCAL; SUBLINGUAL
Refills: 0 | COMMUNITY
Start: 2019-06-05

## 2019-06-26 RX ORDER — VALACYCLOVIR HYDROCHLORIDE 1 G/1
2000 TABLET, FILM COATED ORAL 2 TIMES DAILY
Qty: 4 TABLET | Refills: 0 | Status: SHIPPED | OUTPATIENT
Start: 2019-06-26 | End: 2019-06-27

## 2019-06-26 NOTE — PATIENT INSTRUCTIONS
Cold Sore  A cold sore, also called a fever blister, is a skin infection that causes small, fluid-filled sores to form inside of the mouth or on the lips, gums, nose, chin, or cheeks. Cold sores can spread to other parts of the body, such as the eyes or fingers. In some people with other medical conditions, cold sores can spread to multiple other body sites, including the genitals. Cold sores can be spread or passed from person to person (contagious) until the sores crust over completely.  What are the causes?  Cold sores are caused by the herpes simplex virus (HSV-1). HSV-1 is closely related to the virus that causes genital herpes (HSV-2), but these viruses are not the same. Once a person is infected with HSV-1, the virus remains permanently in the body.  HSV-1 is spread from person to person through close contact, such as through kissing, touching the affected area, or sharing personal items such as lip balm, razors, or eating utensils.  What increases the risk?  A cold sore outbreak is more likely to develop in people who:  · Are tired, stressed, or sick.  · Are menstruating.  · Are pregnant.  · Take certain medicines.  · Are exposed to cold weather or too much sun.    What are the signs or symptoms?  Symptoms of a cold sore outbreak often go through different stages. Here is how a cold sore develops:  · Tingling, itching, or burning is felt 1-2 days before the outbreak.  · Fluid-filled blisters appear on the lips, inside the mouth, on the nose, or on the cheeks.  · The blisters start to ooze clear fluid.  · The blisters dry up and a yellow crust appears in its place.  · The crust falls off.    Other symptoms include:  · Fever.  · Sore throat.  · Headache.  · Muscle aches.  · Swollen neck glands.    You also may not have any symptoms.  How is this diagnosed?  This condition is often diagnosed based on your medical history and a physical exam. Your health care provider may swab your sore and then examine it in  the lab. Rarely, blood tests may be done to check for HSV-1.  How is this treated?  There is no cure for cold sores or HSV-1. There also is no vaccine for HSV-1. Most cold sores go away on their own without treatment within two weeks. Medicines cannot make the infection go away, but medicines can:  · Help relieve some of the pain associated with the sores.  · Work to stop the virus from multiplying.  · Shorten healing time.    Medicines may be in the form of creams, gels, pills, or a shot.  Follow these instructions at home:  Medicines  · Take or apply over-the-counter and prescription medicines only as told by your health care provider.  · Use a cotton-tip swab to apply creams or gels to your sores.  Sore Care  · Do not touch the sores or pick the scabs.  · Wash your hands often. Do not touch your eyes without washing your hands first.  · Keep the sores clean and dry.  · If directed, apply ice to the sores:  ? Put ice in a plastic bag.  ? Place a towel between your skin and the bag.  ? Leave the ice on for 20 minutes, 2-3 times per day.  Lifestyle  · Do not kiss, have oral sex, or share personal items until your sores heal.  · Eat a soft, bland diet. Avoid eating hot, cold, or salty foods. These can hurt your mouth.  · Use a straw if it hurts to drink out of a glass.  · Avoid the sun and limit your stress if these things trigger outbreaks. If sun causes cold sores, apply sunscreen on your lips before being out in the sun.  Contact a health care provider if:  · You have symptoms for more than two weeks.  · You have pus coming from the sores.  · You have redness that is spreading.  · You have pain or irritation in your eye.  · You get sores on your genitals.  · Your sores do not heal within two weeks.  · You have frequent cold sore outbreaks.  Get help right away if:  · You have a fever and your symptoms suddenly get worse.  · You have a headache and confusion.  This information is not intended to replace advice  given to you by your health care provider. Make sure you discuss any questions you have with your health care provider.  Document Released: 12/15/2001 Document Revised: 08/11/2017 Document Reviewed: 10/07/2016  [employer]vier Interactive Patient Education © 2019 [employer]

## 2019-06-26 NOTE — PROGRESS NOTES
"Subjective   Donna London is a 38 y.o. female.   /80   Pulse 88   Temp 98.6 °F (37 °C)   Resp 12   Ht 160 cm (63\")   Wt 43.2 kg (95 lb 3.2 oz)   SpO2 98%   BMI 16.86 kg/m²   Past Medical History:   Diagnosis Date   • Allergic    • Anxiety    • COPD (chronic obstructive pulmonary disease) (CMS/HCC)    • Hypertension    • Oral herpes simplex infection    • Stomach ulcer    • Urinary tract infection      Allergies   Allergen Reactions   • Penicillins Hives         Oral Pain    This is a new problem. The current episode started in the past 7 days (oast 2-3 days). The problem has been gradually worsening. The pain is mild. Associated symptoms include thermal sensitivity. Pertinent negatives include no difficulty swallowing, facial pain, fever, oral bleeding or sinus pressure. Associated symptoms comments: Previous history of oral herpes in same location. She has tried nothing for the symptoms.        The following portions of the patient's history were reviewed and updated as appropriate: allergies, current medications, past family history, past medical history, past social history, past surgical history and problem list.    Review of Systems   Constitutional: Negative.  Negative for fever.   HENT: Positive for sore throat. Negative for rhinorrhea and sinus pressure.    Eyes: Negative.    Respiratory: Negative.    Cardiovascular: Negative.    Gastrointestinal: Negative.    Skin:        Oral lesion       Objective   Physical Exam   Constitutional: She appears well-developed and well-nourished.  Non-toxic appearance.   HENT:   Head: Normocephalic and atraumatic.   Right Ear: Tympanic membrane and ear canal normal.   Left Ear: Tympanic membrane and ear canal normal.   Nose: Nose normal. Right sinus exhibits no maxillary sinus tenderness and no frontal sinus tenderness. Left sinus exhibits no maxillary sinus tenderness and no frontal sinus tenderness.   Mouth/Throat:       Cardiovascular: Regular rhythm and " normal heart sounds.   Pulmonary/Chest: Effort normal. She has no wheezes. She has no rhonchi. She has no rales.   Lymphadenopathy:     She has no cervical adenopathy.   Skin: Skin is warm and dry.       Assessment/Plan   Donna was seen today for sore throat.    Diagnoses and all orders for this visit:    Oral herpes  -     Cancel: POC Rapid Strep A  -     Ambulatory Referral to Family Practice    Sorethroat  -     POC Rapid Strep A    Other orders  -     valACYclovir (VALTREX) 1000 MG tablet; Take 2 tablets by mouth 2 (Two) Times a Day for 1 day.        Results for orders placed or performed in visit on 06/26/19   POC Rapid Strep A   Result Value Ref Range    Rapid Strep A Screen Negative Negative, VALID, INVALID, Not Performed    Internal Control Passed Passed    Lot Number [id number]     Expiration Date 10/31/2020

## 2019-07-01 ENCOUNTER — OFFICE VISIT (OUTPATIENT)
Dept: RETAIL CLINIC | Facility: CLINIC | Age: 39
End: 2019-07-01

## 2019-07-01 VITALS
TEMPERATURE: 98.9 F | OXYGEN SATURATION: 95 % | DIASTOLIC BLOOD PRESSURE: 56 MMHG | BODY MASS INDEX: 16.83 KG/M2 | HEIGHT: 63 IN | SYSTOLIC BLOOD PRESSURE: 100 MMHG | WEIGHT: 95 LBS | HEART RATE: 72 BPM | RESPIRATION RATE: 18 BRPM

## 2019-07-01 DIAGNOSIS — J02.9 PHARYNGITIS, UNSPECIFIED ETIOLOGY: ICD-10-CM

## 2019-07-01 DIAGNOSIS — K04.7 DENTAL INFECTION: Primary | ICD-10-CM

## 2019-07-01 PROCEDURE — 99213 OFFICE O/P EST LOW 20 MIN: CPT | Performed by: NURSE PRACTITIONER

## 2019-07-01 RX ORDER — IBUPROFEN 600 MG/1
600 TABLET ORAL EVERY 6 HOURS PRN
Qty: 40 TABLET | Refills: 0 | Status: SHIPPED | OUTPATIENT
Start: 2019-07-01

## 2019-07-01 RX ORDER — CLINDAMYCIN HYDROCHLORIDE 300 MG/1
300 CAPSULE ORAL 3 TIMES DAILY
Qty: 21 CAPSULE | Refills: 0 | Status: SHIPPED | OUTPATIENT
Start: 2019-07-01 | End: 2019-07-08

## 2019-07-01 RX ORDER — CHLORHEXIDINE GLUCONATE 0.12 MG/ML
15 RINSE ORAL 2 TIMES DAILY
Qty: 473 ML | Refills: 0 | Status: SHIPPED | OUTPATIENT
Start: 2019-07-01 | End: 2019-12-26

## 2019-07-01 NOTE — PROGRESS NOTES
Subjective   Sore Throat and Dental Pain    Donna London is a 38 y.o. female who presents with sore throat and oral pain. Was treated for oral herpes last week with Valtrex.  Has pending appt to establish primary care with Dr. Head.7/3/2019    Sore Throat    This is a new problem. The current episode started 1 to 4 weeks ago. The problem has been gradually worsening. Neither side of throat is experiencing more pain than the other. The maximum temperature recorded prior to her arrival was 100.4 - 100.9 F. The pain is at a severity of 5/10. Pertinent negatives include no congestion, coughing, diarrhea, ear discharge, ear pain, headaches, hoarse voice, plugged ear sensation, shortness of breath, stridor, swollen glands, trouble swallowing or vomiting. She has tried NSAIDs for the symptoms. The treatment provided mild relief.   Dental Pain    This is a recurrent problem. The current episode started 1 to 4 weeks ago. The problem occurs every few minutes. The problem has been gradually worsening. Associated symptoms include facial pain, a fever and thermal sensitivity. Pertinent negatives include no difficulty swallowing or sinus pressure. She has tried NSAIDs for the symptoms. The treatment provided mild relief.        History Obtained from: Patient    Past Medical History:   Diagnosis Date   • Allergic    • Anxiety    • COPD (chronic obstructive pulmonary disease) (CMS/[facility])    • Hypertension    • Opioid abuse, in remission (CMS/[facility])    • Oral herpes simplex infection    • Scoliosis    • Stomach ulcer    • Urinary tract infection      Past Surgical History:   Procedure Laterality Date   • BREAST AUGMENTATION       Social History     Tobacco Use   • Smoking status: Current Every Day Smoker     Packs/day: 1.00     Years: 20.00     Pack years: 20.00     Types: Cigarettes   • Tobacco comment: cutting back   Substance Use Topics   • Alcohol use: No   • Drug use: Yes     Types: Heroin, Hydrocodone     Family History    Problem Relation Age of Onset   • Heart disease Mother    • Diabetes Mother    • Lung disease Mother    • Cancer Father      Allergies   Allergen Reactions   • Penicillins Hives     Current Outpatient Medications   Medication Sig Dispense Refill   • buprenorphine-naloxone (SUBOXONE) 8-2 MG film film DISSOLVE 3/4 FILM UNT QD  0   • chlorhexidine (PERIDEX) 0.12 % solution Apply 15 mL to the mouth or throat 2 (Two) Times a Day. Swish and spit 473 mL 0   • clindamycin (CLEOCIN) 300 MG capsule Take 1 capsule by mouth 3 (Three) Times a Day for 7 days. 21 capsule 0   • ibuprofen (ADVIL,MOTRIN) 600 MG tablet Take 1 tablet by mouth Every 6 (Six) Hours As Needed for Moderate Pain , Fever or Headache. 40 tablet 0     No current facility-administered medications for this visit.         The following portions of the patient's history were reviewed and updated as appropriate: allergies, current medications, past family history, past medical history, past social history and past surgical history.    Review of Systems   Constitutional: Positive for appetite change (decreased), chills, fatigue and fever.   HENT: Positive for facial swelling (perceived), sinus pain (left sinus/upper teeth) and sore throat. Negative for congestion, ear discharge, ear pain, hoarse voice, sinus pressure and trouble swallowing.    Eyes: Negative.    Respiratory: Negative for cough, shortness of breath and stridor.    Cardiovascular: Negative.    Gastrointestinal: Negative for diarrhea, nausea and vomiting.   Endocrine: Negative.    Musculoskeletal: Positive for arthralgias (chronic) and myalgias. Negative for neck stiffness.   Skin: Negative for rash and wound.   Allergic/Immunologic: Negative for immunocompromised state.   Neurological: Negative for dizziness and headaches.   Hematological: Negative.    Psychiatric/Behavioral: Negative for agitation. The patient is not nervous/anxious.        Objective     VITAL SIGNS:   Vitals:    07/01/19 1815   BP:  "100/56   Pulse: 72   Resp: 18   Temp: 98.9 °F (37.2 °C)   SpO2: 95%   Weight: 43.1 kg (95 lb)   Height: 160 cm (63\")   PainSc:   5   PainLoc: Throat   Body mass index is 16.83 kg/m².    Physical Exam   Constitutional: She appears cachectic.  Non-toxic appearance. She does not appear ill. No distress.        HENT:   Head: Atraumatic.   Right Ear: Tympanic membrane, external ear and ear canal normal.   Left Ear: Tympanic membrane, external ear and ear canal normal.   Nose: Rhinorrhea present. No mucosal edema.   Mouth/Throat: Mucous membranes are normal. No oral lesions. Abnormal dentition (tenderness and mild erythema left upper gumline). Dental caries present. No dental abscesses or uvula swelling. Posterior oropharyngeal erythema present. No posterior oropharyngeal edema.   Eyes: Pupils are equal, round, and reactive to light. No scleral icterus.   Neck: Phonation normal. Neck supple. No tracheal deviation present.   Cardiovascular: Normal rate and regular rhythm.   No murmur heard.  Pulmonary/Chest: Effort normal and breath sounds normal.   Lymphadenopathy:     She has no cervical adenopathy.        Right: No supraclavicular adenopathy present.        Left: No supraclavicular adenopathy present.   Neurological: She is alert.   Skin: Skin is warm and dry.   Psychiatric: Her behavior is normal. Her mood appears not anxious. She is attentive.           Assessment/Plan     Donna was seen today for sore throat and dental pain.    Diagnoses and all orders for this visit:    Dental infection    Pharyngitis, unspecified etiology    Other orders  -     clindamycin (CLEOCIN) 300 MG capsule; Take 1 capsule by mouth 3 (Three) Times a Day for 7 days.  -     chlorhexidine (PERIDEX) 0.12 % solution; Apply 15 mL to the mouth or throat 2 (Two) Times a Day. Swish and spit  -     ibuprofen (ADVIL,MOTRIN) 600 MG tablet; Take 1 tablet by mouth Every 6 (Six) Hours As Needed for Moderate Pain , Fever or Headache.        PLAN:  Oral " lesion resolved and I see no evidence of mucosal ulcerations or lesions in the oral cavity. She does have local irritation at the gumline with poor dentition. Patient should follow up with primary care provider as planned. See dentist as soon as feasible.  RX ibuprofen given for pain, may alternate with OTC acetaminophen per bottle instructions. Discussed importance of adequate nutritional intake and po decaffeinated fluids.   The patient voiced understanding and agreement to the patient treatment plan and instructions         JAN Levy

## 2019-07-01 NOTE — PATIENT INSTRUCTIONS
Dental Abscess  A dental abscess is an area of pus in or around a tooth. It comes from an infection. It can cause pain and other symptoms. Treatment will help with symptoms and prevent the infection from spreading.  Follow these instructions at home:  Medicines  · Take over-the-counter and prescription medicines only as told by your dentist.  · If you were prescribed an antibiotic medicine, take it as told by your dentist. Do not stop taking it even if you start to feel better.  · If you were prescribed a gel that has numbing medicine in it, use it exactly as told.  · Do not drive or use heavy machinery (like a ) while taking prescription pain medicine.  General instructions  · Rinse out your mouth often with salt water.  ? To make salt water, dissolve ½-1 tsp of salt in 1 cup of warm water.  · Eat a soft diet while your mouth is healing.  · Drink enough fluid to keep your urine pale yellow.  · Do not apply heat to the outside of your mouth.  · Do not use any products that contain nicotine or tobacco. These include cigarettes and e-cigarettes. If you need help quitting, ask your doctor.  · Keep all follow-up visits as told by your dentist. This is important.  Prevent an abscess  · Brush your teeth every morning and every night. Use fluoride toothpaste.  · Floss your teeth each day.  · Get dental cleanings as often as told by your dentist.  · Think about getting dental sealant put on teeth that have deep holes (decay).  · Drink water that has fluoride in it.  ? Most tap water has fluoride.  ? Check the label on bottled water to see if it has fluoride in it.  · Drink water instead of sugary drinks.  · Eat healthy meals and snacks.  · Wear a mouth guard or face shield when you play sports.  Contact a doctor if:  · Your pain is worse, and medicine does not help.  Get help right away if:  · You have a fever or chills.  · Your symptoms suddenly get worse.  · You have a very bad headache.  · You have problems  breathing or swallowing.  · You have trouble opening your mouth.  · You have swelling in your neck or close to your eye.  Summary  · A dental abscess is an area of pus in or around a tooth. It is caused by an infection.  · Treatment will help with symptoms and prevent the infection from spreading.  · Take over-the-counter and prescription medicines only as told by your dentist.  · To prevent an abscess, take good care of your teeth. Brush your teeth every morning and night. Use floss every day.  · Get dental cleanings as often as told by your dentist.  This information is not intended to replace advice given to you by your health care provider. Make sure you discuss any questions you have with your health care provider.  Document Released: 05/03/2016 Document Revised: 08/20/2018 Document Reviewed: 08/20/2018  [employer] Patient Education © 2019 [employer]    See dentist as soon as feasible  Take ibuprofen per instructions. Take with food  You may alternate with acetaminophen per bottle instructions  Eat small but frequent meals, soft foods advised. Avoid sugary foods, hard candy and chewing gum  Brush teeth with clean, soft bristle toothbrush. Rinse mouth with salt water solution twice daily   Follow up with primary care provider as scheduled.

## 2019-11-12 ENCOUNTER — NON-PROVIDER VISIT (OUTPATIENT)
Dept: WOUND CARE | Facility: MEDICAL CENTER | Age: 39
End: 2019-11-12
Attending: COLON & RECTAL SURGERY
Payer: MEDICAID

## 2019-11-12 DIAGNOSIS — Z00.00 ROUTINE GENERAL MEDICAL EXAMINATION AT A HEALTH CARE FACILITY: ICD-10-CM

## 2019-11-12 DIAGNOSIS — T81.89XA NON-HEALING SURGICAL WOUND, INITIAL ENCOUNTER: ICD-10-CM

## 2019-11-12 PROCEDURE — 97602 WOUND(S) CARE NON-SELECTIVE: CPT

## 2019-11-12 PROCEDURE — 99211 OFF/OP EST MAY X REQ PHY/QHP: CPT

## 2019-11-12 RX ORDER — DIPHENHYDRAMINE HCL 50 MG
50 CAPSULE ORAL EVERY 6 HOURS PRN
COMMUNITY

## 2019-11-12 RX ORDER — SULFAMETHOXAZOLE AND TRIMETHOPRIM 800; 160 MG/1; MG/1
1 TABLET ORAL 2 TIMES DAILY
Status: ON HOLD | COMMUNITY
End: 2021-02-10

## 2019-11-12 NOTE — PATIENT INSTRUCTIONS
-Should you experience any significant changes in your wound(s), such as infection (redness, swelling, localized heat, increased pain, fever > 101 F, chills) or have any questions regarding your home care instructions, please contact the wound center at [phone]. If after hours, contact your primary care physician or go to the hospital emergency room.

## 2019-11-13 NOTE — CERTIFICATION
"Non Provider Encounter- Full Thickness wound    HISTORY OF PRESENT ILLNESS      START OF CARE IN CLINIC: 11/12/2019  REFERRING PROVIDER: Dr. Murray Levine    WOUND- Full thickness wound  LOCATION: Left Abdomen    WOUND HISTORY:   Patient is a 39 year old female with an abdominal wound. She had gastric band removal surgery with Dr. Levine on 10/7/2019, and was instructed to pack her wound with strip packing every day. She was also started on an oral antibiotic. She is currently taking her second course of oral antibiotics (Bactrim). The patient states that her wound has not healed at all since her surgery, so she was referred to this clinic for treatment.     PERTINENT PMH: N/A    IMAGING: None  VASCULAR STUDIES: N/A    LAST  WOUND CULTURE: None                   DIABETES: No    TOBACCO USE: Never Smoker    RESULTS: N/A      FALL RISK ASSESSMENT: Completed 11/12/2019, not a fall risk.   65 years or older     Fall within the last 2 years   Uses ambulatory devices  Loss of protective sensation in feet   Use of prostethic/orthotic    Presence of lower extremity/foot/toe amputation   Taking medication that increases risk (per facility policy)        PAST MEDICAL HISTORY:   Past Medical History:   Diagnosis Date   • ASTHMA 2013    childhood, last attack on september 2013. not on meds   • Diabetes 2007    gestational diabtetes    • Heart burn    • Hypertension 2000    preeclamsia during pregnacy 2000   • Pneumonia 07/2017       PAST SURGICAL HISTORY:   Past Surgical History:   Procedure Laterality Date   • CARPAL TUNNEL ENDOSCOPIC Right 3/14/2018    Procedure: CARPAL TUNNEL ENDOSCOPIC;  Surgeon: José Miguel Perez M.D.;  Location: SURGERY [facility];  Service: Orthopedics   • CARPAL TUNNEL RELEASE Left 11/21/2017   • OTHER ORTHOPEDIC SURGERY Left 11/21/2017    \"partial shoulder replacement\"   • OTHER ORTHOPEDIC SURGERY Left 11/11/2016    Jhony placement left leg   • REPEAT C SECTION  11/6/2015    Procedure: REPEAT C " SECTION;  Surgeon: Tomas Iglesias M.D.;  Location: [facility];  Service:    • REPEAT C SECTION  7/1/2014    Performed by Tomas Iglesias M.D. at [facility]   • OTHER ABDOMINAL SURGERY  2009    LAP BAND   • LEEP  2007   • CHOLECYSTECTOMY  2007   • PRIMARY C SECTION      2000, 2007   • TONSILLECTOMY  at age 17        MEDICATIONS:   Current Outpatient Medications   Medication   • diphenhydrAMINE (BENADRYL) 50 MG Cap   • sulfamethoxazole-trimethoprim (BACTRIM DS) 800-160 MG tablet     No current facility-administered medications for this visit.        ALLERGIES:    Allergies   Allergen Reactions   • Tape Rash     Paper tape ok   • Paroxetine Hives         SOCIAL HISTORY:   Social History     Socioeconomic History   • Marital status:      Spouse name: Not on file   • Number of children: Not on file   • Years of education: Not on file   • Highest education level: Not on file   Occupational History   • Not on file   Social Needs   • Financial resource strain: Not on file   • Food insecurity:     Worry: Not on file     Inability: Not on file   • Transportation needs:     Medical: Not on file     Non-medical: Not on file   Tobacco Use   • Smoking status: Never Smoker   • Smokeless tobacco: Never Used   Substance and Sexual Activity   • Alcohol use: No   • Drug use: No   • Sexual activity: Yes     Partners: Male     Comment: Planned pregnancy   Lifestyle   • Physical activity:     Days per week: Not on file     Minutes per session: Not on file   • Stress: Not on file   Relationships   • Social connections:     Talks on phone: Not on file     Gets together: Not on file     Attends Je[employer]'s witness service: Not on file     Active member of club or organization: Not on file     Attends meetings of clubs or organizations: Not on file     Relationship status: Not on file   • Intimate partner violence:     Fear of current or ex partner: Not on file     Emotionally abused: Not on file     Physically abused: Not on  file     Forced sexual activity: Not on file   Other Topics Concern   • Not on file   Social History Narrative   • Not on file       FAMILY HISTORY:   Family History   Problem Relation Age of Onset   • Diabetes Father    • Hypertension Father    • Heart Disease Father         CABG in 40s   • Hyperlipidemia Father    • Cancer Maternal Grandmother         ovarian   • Cancer Paternal Grandmother    • Cancer Paternal Grandfather        Wound Assessment:  Wound 11/12/19 Full Thickness Wound Abdomen -Left Abdomen (Active)   Wound Image   11/12/2019  3:40 PM   Site Assessment Red;Yellow 11/12/2019  3:40 PM   Bhumi-wound Assessment Intact 11/12/2019  3:40 PM   Margins Epibole (rolled edges) 11/12/2019  3:40 PM   Post Wound Length (cm) 1.2 cm 11/12/2019  3:40 PM    Post Wound Width (cm) 4.6 cm 11/12/2019  3:40 PM   Post Wound Depth (cm) 4.3 cm 11/12/2019  3:40 PM   Post Wound Surface Area (cm^2) 5.52 cm^2 11/12/2019  3:40 PM   Drainage Amount Large 11/12/2019  3:40 PM   Drainage Description Serosanguineous 11/12/2019  3:40 PM   Non-staged Wound Description Full thickness 11/12/2019  3:40 PM   Treatments Cleansed 11/12/2019  3:40 PM   Cleansing Normal Saline Irrigation 11/12/2019  3:40 PM   Periwound Protectant Skin Protectant wipes to Periwound 11/12/2019  3:40 PM   Dressing Options Plain Strip Packing;Adhesive Foam 11/12/2019  3:40 PM   Dressing Changed New 11/12/2019  3:40 PM   WOUND NURSE ONLY - Odor None 11/12/2019  3:40 PM   WOUND NURSE ONLY - Exposed Structures Other (Comments) 11/12/2019  3:40 PM   WOUND NURSE ONLY - Tissue Type and Percentage 90% red, 10% yellow. 11/12/2019  3:40 PM            Pre-debridement Photo            Procedures:    Debridement: Nonselective debridement with NS, gauze, and cotton tipped applicators.          PATIENT EDUCATION  -Discussed possibility of wound vac therapy to accelerate wound healing. Patient agrees to wound vac therapy.  -Advised to go to ER for any increased redness, swelling,  drainage or odor, or if patient develops fever, chills, nausea or vomiting.  -Importance of adequate nutrition for wound healing  -Increase protein intake (unless contraindicated by renal status)

## 2019-11-15 ENCOUNTER — TELEPHONE (OUTPATIENT)
Dept: WOUND CARE | Facility: MEDICAL CENTER | Age: 39
End: 2019-11-15

## 2019-11-15 NOTE — TELEPHONE ENCOUNTER
Left patient a voicemail advising her that she will need have labs done before her insurance company will authorize wound vac therapy.

## 2019-11-19 ENCOUNTER — OFFICE VISIT (OUTPATIENT)
Dept: WOUND CARE | Facility: MEDICAL CENTER | Age: 39
End: 2019-11-19
Attending: COLON & RECTAL SURGERY
Payer: MEDICAID

## 2019-11-19 VITALS
RESPIRATION RATE: 18 BRPM | SYSTOLIC BLOOD PRESSURE: 130 MMHG | DIASTOLIC BLOOD PRESSURE: 69 MMHG | OXYGEN SATURATION: 95 % | TEMPERATURE: 97.4 F | HEART RATE: 73 BPM

## 2019-11-19 DIAGNOSIS — T81.89XA NON-HEALING SURGICAL WOUND, INITIAL ENCOUNTER: ICD-10-CM

## 2019-11-19 PROCEDURE — 11042 DBRDMT SUBQ TIS 1ST 20SQCM/<: CPT | Performed by: NURSE PRACTITIONER

## 2019-11-19 PROCEDURE — 11042 DBRDMT SUBQ TIS 1ST 20SQCM/<: CPT

## 2019-11-19 ASSESSMENT — ENCOUNTER SYMPTOMS
NAUSEA: 0
HEADACHES: 0
DIZZINESS: 0
CHILLS: 0
DEPRESSION: 0
MYALGIAS: 0
SHORTNESS OF BREATH: 0
VOMITING: 0
FEVER: 0
DIARRHEA: 0
ABDOMINAL PAIN: 0

## 2019-11-19 ASSESSMENT — PAIN SCALES - GENERAL: PAINLEVEL: 7=MODERATE-SEVERE PAIN

## 2019-11-19 NOTE — PROGRESS NOTES
Provider Encounter- Full Thickness wound    HISTORY OF PRESENT ILLNESS  Wound History:    START OF CARE IN CLINIC: 11/12/2019    REFERRING PROVIDER: Dr. Murray Levine     WOUND- Full Thickness Wound   LOCATION: Left Abdomen   HISTORY: Patient is a 39 year old female with an abdominal wound. She had gastric band removal surgery with Dr. Levine on 10/7/2019, and was instructed to pack her wound with strip packing every day. Approx. 2 weeks after surgery she reports increased pain and concern of infection. She was first treated with a course of Keflex with no improvement. She was subsequently prescribed Bactrim by Dr. Levine. Last dose today 11/19/19. The patient states that her wound has not healed at all since her surgery, so she was referred to this clinic for evaluation and treatment.    Pertinent Medical History: obesity s/p gastric band surgery 10 years ago in [place], subsequent removal of band 10/7/19.     TOBACCO USE: Never smoker    Patient's problem list, allergies, and current medications reviewed and updated in Epic    Interval History:  11/19/2019: Initial provider visit with MEE Mcdermott. Pt reports feeling well. She states that she has not really noticed any improvement in the wound healing since surgery. Her  has been packing the wound and changing the dressing.  Patient reports feeling well. Denies wound drainage, odor. Denies current erythema, swelling, pain- stating these symptoms did improve with the recent course of Bactrim..       REVIEW OF SYSTEMS:   Review of Systems   Constitutional: Negative for chills and fever.   Respiratory: Negative for shortness of breath.    Cardiovascular: Negative for chest pain.   Gastrointestinal: Negative for abdominal pain, diarrhea, nausea and vomiting.   Musculoskeletal: Negative for myalgias.   Skin: Negative for rash.   Neurological: Negative for dizziness and headaches.   Psychiatric/Behavioral: Negative for depression.       PHYSICAL EXAMINATION:   BP  130/69   Pulse 73   Temp 36.3 °C (97.4 °F)   Resp 18   SpO2 95%     Physical Exam   Constitutional: She is oriented to person, place, and time and well-developed, well-nourished, and in no distress. No distress.   HENT:   Head: Normocephalic and atraumatic.   Eyes: Pupils are equal, round, and reactive to light. Conjunctivae and EOM are normal.   Neck: Normal range of motion. Neck supple.   Cardiovascular: Normal rate.   Pulmonary/Chest: Effort normal. No respiratory distress.   Abdominal: Soft. She exhibits no distension. There is no tenderness.   Musculoskeletal: Normal range of motion.   Neurological: She is alert and oriented to person, place, and time.   Skin: Skin is warm and dry.   Psychiatric: Mood, memory, affect and judgment normal.       WOUND ASSESSMENT    Wound 11/12/19 Full Thickness Wound Abdomen -Left Abdomen (Active)   Wound Image   11/12/2019  3:40 PM   Site Assessment Red;Yellow 11/19/2019  9:00 AM   Bhumi-wound Assessment Scar tissue 11/19/2019  9:00 AM   Margins Epibole (rolled edges) 11/19/2019  9:00 AM   Wound Length (cm) 1 cm 11/19/2019  9:00 AM   Wound Width (cm) 4.2 cm 11/19/2019  9:00 AM   Wound Depth (cm) 4.7 cm 11/19/2019  9:00 AM   Wound Surface Area (cm^2) 4.2 cm^2 11/19/2019  9:00 AM   Post Wound Length (cm) 1 cm 11/19/2019  9:00 AM    Post Wound Width (cm) 4.3 cm 11/19/2019  9:00 AM   Post Wound Depth (cm) 1.7 cm 11/19/2019  9:00 AM   Post Wound Surface Area (cm^2) 4.3 cm^2 11/19/2019  9:00 AM   Drainage Amount Large 11/19/2019  9:00 AM   Drainage Description Serosanguineous 11/19/2019  9:00 AM   Non-staged Wound Description Full thickness 11/19/2019  9:00 AM   Treatments Cleansed;Pharmaceutical agent;Other (Comment) 11/19/2019  9:00 AM   Cleansing Normal Saline Irrigation 11/19/2019  9:00 AM   Periwound Protectant Skin Protectant wipes to Periwound 11/19/2019  9:00 AM   Dressing Options Collagen Dressing;Plain Strip Packing;Nonadhesive Foam;Hypafix Tape 11/19/2019  9:00 AM    Dressing Changed New 11/12/2019  3:40 PM   WOUND NURSE ONLY - Odor None 11/19/2019  9:00 AM   WOUND NURSE ONLY - Exposed Structures Other (Comments) 11/19/2019  9:00 AM   WOUND NURSE ONLY - Tissue Type and Percentage Pre: 90% red, 10% yellow visible; MINGO fully 11/19/2019  9:00 AM            PROCEDURE:   -2% viscous lidocaine applied topically to wound bed for approximately 5 minutes prior to debridement  -Curette used to debride wound bed.  Excisional debridement was performed to remove devitalized tissue until healthy, bleeding tissue was visualized.   Entire surface of wound, 4.3 cm2 debrided.  Tissue debrided into the subcutaneous  layer.    -Bleeding controlled with manual pressure.    -Wound care completed by wound RN, refer to flowsheet  -Patient tolerated the procedure well, without c/o pain or discomfort.       Pertinent Labs and Diagnostics:    Labs:     A1c:   Lab Results   Component Value Date/Time    HBA1C 5.8 (H) 03/13/2018 01:59 PM             ASSESSMENT AND PLAN:   1. Non-healing surgical wound, initial encounter  Comments: s/p gastric band surgery 10 years ago in [place], subsequent removal of band 10/7/19 by Dr Levine    11/19/19:   Excisional debridement of wound in clinic today, medically necessary to promote wound healing.  Pt completing course of Bactrim as prescribed by Dr. Levine. Denies adverse effects  Wound care: Collagen, packing strip, non adhesive foam  Pt wound benefit from a wound vac. Needs labs completed. Orders reprinted for patient today- stating that she will go have done today or tomorrow.       PATIENT EDUCATION  - Importance of adequate nutrition for wound healing  -Advised to go to ER for any increased redness, swelling, drainage, or odor, or if patient develops fever, chills, nausea or vomiting.     15 min spent face to face with patient, >50% of time spent counseling, coordinating care, reviewing records, discussing POC, educating patient regarding wound healing and progression.   This time was spent in excess to procedure time.       Please note that this note may have been created using voice recognition software. I have worked with technical experts from [facility] to optimize the interface.  I have made every reasonable attempt to correct obvious errors, but there may be errors of grammar and possibly content that I did not discover before finalizing the note.    N

## 2019-11-19 NOTE — PATIENT INSTRUCTIONS
Keep dressing clean and dry and cover while bathing. Only change dressing if over saturated, soiled or its falling off.     Should you experience any significant changes in your wound(s) such as infection (redness, swelling, localized heat, increased pain, fever >101 F, chills) or have any questions regarding your home care instructions, please contact the wound center [phone]. If after hours, contact your primary care physician or go the hospital emergency room.

## 2019-11-20 NOTE — PROGRESS NOTES
Per PAR, patient called to inform clinic that she had her labs done at [facility] as [facility] did not accept her insurance.

## 2019-11-26 ENCOUNTER — OFFICE VISIT (OUTPATIENT)
Dept: WOUND CARE | Facility: MEDICAL CENTER | Age: 39
End: 2019-11-26
Attending: COLON & RECTAL SURGERY
Payer: MEDICAID

## 2019-11-26 VITALS
SYSTOLIC BLOOD PRESSURE: 111 MMHG | DIASTOLIC BLOOD PRESSURE: 71 MMHG | OXYGEN SATURATION: 95 % | TEMPERATURE: 97.8 F | RESPIRATION RATE: 16 BRPM | HEART RATE: 83 BPM

## 2019-11-26 DIAGNOSIS — T81.89XA NON-HEALING SURGICAL WOUND, INITIAL ENCOUNTER: ICD-10-CM

## 2019-11-26 PROCEDURE — 11042 DBRDMT SUBQ TIS 1ST 20SQCM/<: CPT

## 2019-11-26 PROCEDURE — 11042 DBRDMT SUBQ TIS 1ST 20SQCM/<: CPT | Performed by: NURSE PRACTITIONER

## 2019-11-26 ASSESSMENT — ENCOUNTER SYMPTOMS
DEPRESSION: 0
VOMITING: 0
DIARRHEA: 0
FEVER: 0
CHILLS: 0
ABDOMINAL PAIN: 0
NAUSEA: 0
MYALGIAS: 0
DIZZINESS: 0
HEADACHES: 0
SHORTNESS OF BREATH: 0

## 2019-11-26 ASSESSMENT — PAIN SCALES - GENERAL: PAINLEVEL: 6=MODERATE PAIN

## 2019-11-26 NOTE — PATIENT INSTRUCTIONS
Should you experience any significant changes in your wound(s) such as infection (redness, swelling, localized heat, increased pain, fever >101 F, chills) or have any questions regarding your home care instructions, please contact the wound center [phone]. If after hours, contact your primary care physician or go the hospital emergency room.  Keep dressing clean and dry and cover while bathing. Only change dressing if over saturated, soiled or its falling off.

## 2019-11-27 NOTE — PROGRESS NOTES
Provider Encounter- Full Thickness wound    HISTORY OF PRESENT ILLNESS  Wound History:    START OF CARE IN CLINIC: 11/12/2019    REFERRING PROVIDER: Dr. Murray Levine     WOUND- Full Thickness Wound   LOCATION: Left Abdomen   HISTORY: Patient is a 39 year old female with an abdominal wound. She had gastric band removal surgery with Dr. Levine on 10/7/2019, and was instructed to pack her wound with strip packing every day. Approx. 2 weeks after surgery she reports increased pain and concern of infection. She was first treated with a course of Keflex with no improvement. She was subsequently prescribed Bactrim by Dr. Levine. Last dose today 11/19/19. The patient states that her wound has not healed at all since her surgery, so she was referred to this clinic for evaluation and treatment.    Pertinent Medical History: obesity s/p gastric band surgery 10 years ago in [place], subsequent removal of band 10/7/19.     TOBACCO USE: Never smoker    Patient's problem list, allergies, and current medications reviewed and updated in Epic    Interval History:  11/19/2019: Initial provider visit with MEE Mcdermott. Pt reports feeling well. She states that she has not really noticed any improvement in the wound healing since surgery. Her  has been packing the wound and changing the dressing.  Patient reports feeling well. Denies wound drainage, odor. Denies current erythema, swelling, pain- stating these symptoms did improve with the recent course of Bactrim.    11/26/19: Clinic visit with MEE Mcdermott, FNP-BC. Patient reports feeling well, pt is happy with wound progress reporting that wound is smaller. Denies wound drainage, odor. Denies erythema, swelling, pain.  Her  has been packing the wound and changing the dressing. She states that she wants to get this wound healed as quickly as possible.  She is very busy stating that she has 3 jobs and 5 kids. She has received wound vac, but unfortunately given the holiday  we can not place today as there are no appts available Thursday or Friday.  Will schedule for wound vac placement and FU appts for next week.       REVIEW OF SYSTEMS:   Review of Systems   Constitutional: Negative for chills and fever.   Respiratory: Negative for shortness of breath.    Cardiovascular: Negative for chest pain.   Gastrointestinal: Negative for abdominal pain, diarrhea, nausea and vomiting.   Musculoskeletal: Negative for myalgias.   Skin: Negative for rash.   Neurological: Negative for dizziness and headaches.   Psychiatric/Behavioral: Negative for depression.       PHYSICAL EXAMINATION:   /71   Pulse 83   Temp 36.6 °C (97.8 °F)   Resp 16   SpO2 95%     Physical Exam   Constitutional: She is oriented to person, place, and time and well-developed, well-nourished, and in no distress. No distress.   HENT:   Head: Normocephalic and atraumatic.   Eyes: Pupils are equal, round, and reactive to light. Conjunctivae and EOM are normal.   Neck: Normal range of motion. Neck supple.   Cardiovascular: Normal rate.   Pulmonary/Chest: Effort normal. No respiratory distress.   Abdominal: Soft. She exhibits no distension. There is no tenderness.   Musculoskeletal: Normal range of motion.   Neurological: She is alert and oriented to person, place, and time.   Skin: Skin is warm and dry.   Psychiatric: Mood, memory, affect and judgment normal.       WOUND ASSESSMENT       Wound 11/12/19 Full Thickness Wound Abdomen -Left Abdomen (Active)   Wound Image    11/26/2019  1:50 PM   Site Assessment Red;Yellow 11/26/2019  1:50 PM   Bhumi-wound Assessment Scar tissue;Red 11/26/2019  1:50 PM   Margins Attached edges 11/26/2019  1:50 PM   Wound Length (cm) 1.2 cm 11/26/2019  1:50 PM   Wound Width (cm) 4 cm 11/26/2019  1:50 PM   Wound Depth (cm) 4.2 cm 11/26/2019  1:50 PM   Wound Surface Area (cm^2) 4.8 cm^2 11/26/2019  1:50 PM   Post Wound Length (cm) 1.2 cm 11/26/2019  1:50 PM    Post Wound Width (cm) 4.1 cm 11/26/2019   1:50 PM   Post Wound Depth (cm) 4.2 cm 11/26/2019  1:50 PM   Post Wound Surface Area (cm^2) 4.92 cm^2 11/26/2019  1:50 PM   Tunneling 0 cm 11/26/2019  1:50 PM   Undermining 0 cm 11/26/2019  1:50 PM   Drainage Amount Large 11/26/2019  1:50 PM   Drainage Description Serosanguineous 11/26/2019  1:50 PM   Non-staged Wound Description Full thickness 11/26/2019  1:50 PM   Treatments Cleansed 11/26/2019  1:50 PM   Cleansing Normal Saline Irrigation 11/26/2019  1:50 PM   Periwound Protectant Skin Protectant wipes to Periwound 11/26/2019  1:50 PM   Dressing Options Collagen Dressing;Nonadhesive Foam;Transparent Film;Dry Gauze 11/26/2019  1:50 PM   Dressing Changed Changed 11/26/2019  1:50 PM   Dressing Status Clean;Dry;Intact 11/26/2019  1:50 PM   Dressing Change Frequency Every 72 hrs 11/26/2019  1:50 PM   WOUND NURSE ONLY - Odor None 11/26/2019  1:50 PM   WOUND NURSE ONLY - Tissue Type and Percentage Pre: 5% yellow; 95% red 11/26/2019  1:50 PM        PROCEDURE:   -2% viscous lidocaine applied topically to wound bed for approximately 5 minutes prior to debridement  -Curette used to debride wound bed.  Excisional debridement was performed to remove devitalized tissue until healthy, bleeding tissue was visualized.   Entire surface of wound, 4.92 cm2 debrided.  Tissue debrided into the subcutaneous  layer.    -Bleeding controlled with manual pressure.    -Wound care completed by wound RN, refer to flowsheet  -Patient tolerated the procedure well, without c/o pain or discomfort.       Pertinent Labs and Diagnostics:    Labs:     A1c:   Lab Results   Component Value Date/Time    HBA1C 5.8 (H) 03/13/2018 01:59 PM             ASSESSMENT AND PLAN:   1. Non-healing surgical wound, initial encounter  Comments: s/p gastric band surgery 10 years ago in [place], subsequent removal of band 10/7/19 by Dr Levine    11/26/19:   Excisional debridement of wound in clinic today, medically necessary to promote wound healing. Pt tolerated well.    Wound care: Collagen packing to accelerate granulation, Hydrofiber to manage exudate, foam cover dressing, Hypafix tape    Pt has received wound vac. Will schedule appts for placement and FU      PATIENT EDUCATION  - Importance of adequate nutrition for wound healing  -Advised to go to ER for any increased redness, swelling, drainage, or odor, or if patient develops fever, chills, nausea or vomiting.       Please note that this note may have been created using voice recognition software. I have worked with technical experts from [facility] to optimize the interface.  I have made every reasonable attempt to correct obvious errors, but there may be errors of grammar and possibly content that I did not discover before finalizing the note.    N

## 2019-12-03 ENCOUNTER — APPOINTMENT (OUTPATIENT)
Dept: WOUND CARE | Facility: MEDICAL CENTER | Age: 39
End: 2019-12-03
Attending: COLON & RECTAL SURGERY
Payer: MEDICAID

## 2019-12-04 ENCOUNTER — NON-PROVIDER VISIT (OUTPATIENT)
Dept: WOUND CARE | Facility: MEDICAL CENTER | Age: 39
End: 2019-12-04
Attending: COLON & RECTAL SURGERY
Payer: MEDICAID

## 2019-12-04 PROCEDURE — 97602 WOUND(S) CARE NON-SELECTIVE: CPT

## 2019-12-05 NOTE — PATIENT INSTRUCTIONS
Keep dressing clean and dry and cover while bathing. Only change dressing if over saturated, soiled or its falling off.     Should you experience any significant changes in your wound(s) such as infection (redness, swelling, localized heat, increased pain, fever >101 F, chills) or have any questions regarding your home care instructions, please contact the wound center [phone]. If after hours, contact your primary care physician or go the hospital emergency room.

## 2019-12-05 NOTE — PROCEDURES
Nonselective debridement with NS, gauze, and cotton tip applicator to remove nonviable tissue from wound bed.

## 2019-12-05 NOTE — NON-PROVIDER
Pt brought her wound vac. This visit was suppose to be initial wound vac application, however full 60 minutes appointment was not available. Also pt's wound had made significant improvement since last visit. Wound measurement decreased from 1.2x4.1x4.2 (11/26/19) to 0.8x2.5x0.7 (12/4/19). Discussed with pt about cancelling Friday appointment and coming back on Monday appointment with Rupesh CABAN for further evaluation and assessment to determine VAC therapy to be initiated. Pt agreeable and verbalized understanding.

## 2019-12-06 ENCOUNTER — APPOINTMENT (OUTPATIENT)
Dept: WOUND CARE | Facility: MEDICAL CENTER | Age: 39
End: 2019-12-06
Attending: COLON & RECTAL SURGERY
Payer: MEDICAID

## 2019-12-09 ENCOUNTER — OFFICE VISIT (OUTPATIENT)
Dept: WOUND CARE | Facility: MEDICAL CENTER | Age: 39
End: 2019-12-09
Attending: COLON & RECTAL SURGERY
Payer: MEDICAID

## 2019-12-09 VITALS
TEMPERATURE: 97.9 F | SYSTOLIC BLOOD PRESSURE: 128 MMHG | RESPIRATION RATE: 18 BRPM | DIASTOLIC BLOOD PRESSURE: 79 MMHG | HEART RATE: 78 BPM | OXYGEN SATURATION: 96 %

## 2019-12-09 DIAGNOSIS — T14.8XXA PAIN ASSOCIATED WITH WOUND: ICD-10-CM

## 2019-12-09 DIAGNOSIS — T81.89XA NON-HEALING SURGICAL WOUND, INITIAL ENCOUNTER: Primary | ICD-10-CM

## 2019-12-09 DIAGNOSIS — R52 PAIN ASSOCIATED WITH WOUND: ICD-10-CM

## 2019-12-09 PROCEDURE — 11042 DBRDMT SUBQ TIS 1ST 20SQCM/<: CPT

## 2019-12-09 PROCEDURE — 11042 DBRDMT SUBQ TIS 1ST 20SQCM/<: CPT | Performed by: NURSE PRACTITIONER

## 2019-12-09 ASSESSMENT — ENCOUNTER SYMPTOMS
NAUSEA: 0
SHORTNESS OF BREATH: 0
VOMITING: 0
DIZZINESS: 0
FEVER: 0
CHILLS: 0
ABDOMINAL PAIN: 0
DEPRESSION: 0
PALPITATIONS: 0
DIARRHEA: 0
HEADACHES: 0

## 2019-12-09 NOTE — PROGRESS NOTES
Provider Encounter- Full Thickness wound    HISTORY OF PRESENT ILLNESS  Wound History:    START OF CARE IN CLINIC: 11/12/2019    REFERRING PROVIDER: Dr. Murray Levine     WOUND- Full Thickness Wound   LOCATION: Left Abdomen   HISTORY: Patient is a 39 year old female with an abdominal wound. She had gastric band removal surgery with Dr. Levine on 10/7/2019, and was instructed to pack her wound with strip packing every day. Approx. 2 weeks after surgery she reports increased pain and concern of infection. She was first treated with a course of Keflex with no improvement. She was subsequently prescribed Bactrim by Dr. Levine. Last dose today 11/19/19. The patient states that her wound has not healed at all since her surgery, so she was referred to this clinic for evaluation and treatment.    Pertinent Medical History: obesity s/p gastric band surgery 10 years ago in [place], subsequent removal of band 10/7/19.     TOBACCO USE: Never smoker    Patient's problem list, allergies, and current medications reviewed and updated in Epic    Interval History:  11/19/2019: Initial provider visit with MEE Mcdermott. Pt reports feeling well. She states that she has not really noticed any improvement in the wound healing since surgery. Her  has been packing the wound and changing the dressing.  Patient reports feeling well. Denies wound drainage, odor. Denies current erythema, swelling, pain- stating these symptoms did improve with the recent course of Bactrim.    11/26/19: Clinic visit with MEE Mcdermott, FNP-BC. Patient reports feeling well, pt is happy with wound progress reporting that wound is smaller. Denies wound drainage, odor. Denies erythema, swelling, pain.  Her  has been packing the wound and changing the dressing. She states that she wants to get this wound healed as quickly as possible.  She is very busy stating that she has 3 jobs and 5 kids. She has received wound vac, but unfortunately given the holiday  we can not place today as there are no appts available Thursday or Friday.  Will schedule for wound vac placement and FU appts for next week.     12/9/2019: Clinic visit with MEE Cantu. Patient states that they are feeling well today.  Patient denies fever, chills, nausea, vomiting, lightheadedness, dizziness, shortness of breath and chest pain.  The wound has decreased in size and has good epithelialized tissue.  Depth has significantly decreased and wound VAC is no longer necessary at this time.  Patient instructed to return VAC to [facility].  Patient is pleased with progression of wound at this time.    REVIEW OF SYSTEMS:   Review of Systems   Constitutional: Negative for chills and fever.   Respiratory: Negative for shortness of breath.    Cardiovascular: Negative for chest pain, palpitations and leg swelling.   Gastrointestinal: Negative for abdominal pain, diarrhea, nausea and vomiting.   Skin: Negative for itching.   Neurological: Negative for dizziness and headaches.   Psychiatric/Behavioral: Negative for depression.       PHYSICAL EXAMINATION:   /79   Pulse 78   Temp 36.6 °C (97.9 °F)   Resp 18   SpO2 96%     Physical Exam   Constitutional: She is oriented to person, place, and time and well-developed, well-nourished, and in no distress. No distress.   HENT:   Head: Normocephalic and atraumatic.   Eyes: Pupils are equal, round, and reactive to light. Conjunctivae and EOM are normal.   Neck: Normal range of motion. Neck supple.   Cardiovascular: Normal rate.   Pulmonary/Chest: Effort normal. No respiratory distress.   Abdominal: Soft. She exhibits no distension. There is no tenderness.   Musculoskeletal: Normal range of motion.   Neurological: She is alert and oriented to person, place, and time.   Skin: Skin is warm and dry.   Patient with development of small rash from dressing removal   Psychiatric: Mood, memory, affect and judgment normal.       WOUND ASSESSMENT        Wound 11/12/19  Full Thickness Wound Abdomen -Left Abdomen (Active)   Wound Image   12/9/2019 10:00 AM   Site Assessment Red;Yellow;Other (Comment) 12/9/2019 10:00 AM   Bhumi-wound Assessment Scar tissue;Other (Comment) 12/9/2019 10:00 AM   Margins Attached edges 12/9/2019 10:00 AM   Wound Length (cm) 0.5 cm 12/9/2019 10:00 AM   Wound Width (cm) 0.2 cm 12/9/2019 10:00 AM   Wound Depth (cm) 0.4 cm 12/9/2019 10:00 AM   Wound Surface Area (cm^2) 0.1 cm^2 12/9/2019 10:00 AM   Post Wound Length (cm) 0.5 cm 12/9/2019 10:00 AM    Post Wound Width (cm) 2.4 cm 12/9/2019 10:00 AM   Post Wound Depth (cm) 0.4 cm 12/9/2019 10:00 AM   Post Wound Surface Area (cm^2) 1.2 cm^2 12/9/2019 10:00 AM   Tunneling 0 cm 12/9/2019 10:00 AM   Undermining 0 cm 12/9/2019 10:00 AM   Closure Secondary intention 12/9/2019 10:00 AM   Drainage Amount Small 12/9/2019 10:00 AM   Drainage Description Serosanguineous 12/9/2019 10:00 AM   Non-staged Wound Description Full thickness 12/9/2019 10:00 AM   Treatments Cleansed;Pharmaceutical agent;Other (Comment) 12/9/2019 10:00 AM   Cleansing Normal Saline Irrigation 12/9/2019 10:00 AM   Dressing Options Collagen Dressing;Nonadhesive Foam;Transparent Film;Other (Comments) 12/9/2019 10:00 AM   Dressing Cleansing/Solutions Normal Saline 12/9/2019 10:00 AM   Dressing Changed Changed 12/9/2019 10:00 AM   Dressing Status Clean;Dry;Intact 12/9/2019 10:00 AM   Dressing Change Frequency Every 72 hrs 12/9/2019 10:00 AM   WOUND NURSE ONLY - Odor None 12/9/2019 10:00 AM   WOUND NURSE ONLY - Exposed Structures None 12/9/2019 10:00 AM   WOUND NURSE ONLY - Tissue Type and Percentage pre: 80% red, 20% crusty yellow 12/9/2019 10:00 AM          PROCEDURE:   -2% viscous lidocaine applied topically to wound bed for approximately 5 minutes prior to debridement  -Curette used to debride wound bed.  Excisional debridement was performed to remove devitalized tissue until healthy, bleeding tissue was visualized.   Entire surface of wound, 1.2 cm2  debrided.  Tissue debrided into the subcutaneous  layer.    -Bleeding controlled with manual pressure.    -Wound care completed by wound RN, refer to flowsheet  -Patient tolerated the procedure well, without c/o pain or discomfort.       Pertinent Labs and Diagnostics:    Labs:     A1c:   Lab Results   Component Value Date/Time    HBA1C 5.8 (H) 03/13/2018 01:59 PM             ASSESSMENT AND PLAN:   1. Non-healing surgical wound, initial encounter  Comments: s/p gastric band surgery 10 years ago in [place], subsequent removal of band 10/7/19 by Dr Levine    12/9/219  Excisional debridement of wound in clinic today, medically necessary to promote wound healing. Pt tolerated well.   -Patient reports that she has a wound VAC in her car instructed [facility]  -Debridement, Epiona and foam dressing seem to be decreasing the size of wound will continue with this management.     Wound care: Collagen packing to accelerate granulation,foam cover dressing, transparent film for securement    2.  Pain associated with wound  -2% viscous lidocaine applied topically to wound bed for approximately 5 minutes prior to debridement  -Patient tolerated procedure today with no complaints of discomfort.      PATIENT EDUCATION  - Importance of adequate nutrition for wound healing  -Advised to go to ER for any increased redness, swelling, drainage, or odor, or if patient develops fever, chills, nausea or vomiting.       Please note that this note may have been created using voice recognition software. I have worked with technical experts from [facility] to optimize the interface.  I have made every reasonable attempt to correct obvious errors, but there may be errors of grammar and possibly content that I did not discover before finalizing the note.    N

## 2019-12-09 NOTE — PATIENT INSTRUCTIONS
-Keep your wound dressing clean, dry, and intact.    -Change your dressing once half way between wound clinic appts and if it becomes soiled, soaked, or falls off.    -Should you experience any significant changes in your wound(s), such as infection (redness, swelling, localized heat, increased pain, fever > 101 F, chills) or have any questions regarding your home care instructions, please contact the wound center at [phone]. If after hours, contact your primary care physician or go to the hospital emergency room.

## 2019-12-10 ENCOUNTER — APPOINTMENT (OUTPATIENT)
Dept: WOUND CARE | Facility: MEDICAL CENTER | Age: 39
End: 2019-12-10
Attending: COLON & RECTAL SURGERY
Payer: MEDICAID

## 2019-12-11 ENCOUNTER — APPOINTMENT (OUTPATIENT)
Dept: WOUND CARE | Facility: MEDICAL CENTER | Age: 39
End: 2019-12-11
Attending: COLON & RECTAL SURGERY
Payer: MEDICAID

## 2019-12-13 ENCOUNTER — APPOINTMENT (OUTPATIENT)
Dept: WOUND CARE | Facility: MEDICAL CENTER | Age: 39
End: 2019-12-13
Attending: COLON & RECTAL SURGERY
Payer: MEDICAID

## 2019-12-17 ENCOUNTER — OFFICE VISIT (OUTPATIENT)
Dept: WOUND CARE | Facility: MEDICAL CENTER | Age: 39
End: 2019-12-17
Attending: COLON & RECTAL SURGERY
Payer: MEDICAID

## 2019-12-17 VITALS
HEART RATE: 77 BPM | RESPIRATION RATE: 18 BRPM | DIASTOLIC BLOOD PRESSURE: 66 MMHG | OXYGEN SATURATION: 96 % | TEMPERATURE: 97.2 F | SYSTOLIC BLOOD PRESSURE: 116 MMHG

## 2019-12-17 DIAGNOSIS — T14.8XXA PAIN ASSOCIATED WITH WOUND: ICD-10-CM

## 2019-12-17 DIAGNOSIS — T81.89XA NON-HEALING SURGICAL WOUND, INITIAL ENCOUNTER: Primary | ICD-10-CM

## 2019-12-17 DIAGNOSIS — R52 PAIN ASSOCIATED WITH WOUND: ICD-10-CM

## 2019-12-17 PROCEDURE — 11042 DBRDMT SUBQ TIS 1ST 20SQCM/<: CPT

## 2019-12-17 PROCEDURE — 11042 DBRDMT SUBQ TIS 1ST 20SQCM/<: CPT | Performed by: NURSE PRACTITIONER

## 2019-12-17 ASSESSMENT — PAIN SCALES - GENERAL: PAINLEVEL: NO PAIN

## 2019-12-17 NOTE — PATIENT INSTRUCTIONS
Keep dressing clean and dry and cover while bathing. Only change dressing if over saturated, soiled or its falling off.     Should you experience any significant changes in your wound(s) such as infection (redness, swelling, localized heat, increased pain, fever >101 F, chills) or have any questions regarding your home care instructions, please contact the wound center [phone]. If after hours, contact your primary care physician or go the hospital emergency room.

## 2019-12-18 ENCOUNTER — APPOINTMENT (OUTPATIENT)
Dept: WOUND CARE | Facility: MEDICAL CENTER | Age: 39
End: 2019-12-18
Attending: COLON & RECTAL SURGERY
Payer: MEDICAID

## 2019-12-20 ENCOUNTER — APPOINTMENT (OUTPATIENT)
Dept: WOUND CARE | Facility: MEDICAL CENTER | Age: 39
End: 2019-12-20
Attending: COLON & RECTAL SURGERY
Payer: MEDICAID

## 2019-12-21 ASSESSMENT — ENCOUNTER SYMPTOMS
ABDOMINAL PAIN: 0
HEADACHES: 0
NAUSEA: 0
DIARRHEA: 0
FEVER: 0
CHILLS: 0
SHORTNESS OF BREATH: 0
PALPITATIONS: 0
VOMITING: 0
DIZZINESS: 0
DEPRESSION: 0

## 2019-12-21 NOTE — PROGRESS NOTES
Provider Encounter- Full Thickness wound    HISTORY OF PRESENT ILLNESS  Wound History:    START OF CARE IN CLINIC: 11/12/2019    REFERRING PROVIDER: Dr. Murray Levine     WOUND- Full Thickness Wound   LOCATION: Left Abdomen   HISTORY: Patient is a 39 year old female with an abdominal wound. She had gastric band removal surgery with Dr. Levine on 10/7/2019, and was instructed to pack her wound with strip packing every day. Approx. 2 weeks after surgery she reports increased pain and concern of infection. She was first treated with a course of Keflex with no improvement. She was subsequently prescribed Bactrim by Dr. Levine. Last dose today 11/19/19. The patient states that her wound has not healed at all since her surgery, so she was referred to this clinic for evaluation and treatment.    Pertinent Medical History: obesity s/p gastric band surgery 10 years ago in [place], subsequent removal of band 10/7/19.     TOBACCO USE: Never smoker    Patient's problem list, allergies, and current medications reviewed and updated in Epic    Interval History:  11/19/2019: Initial provider visit with MEE Mcdermott. Pt reports feeling well. She states that she has not really noticed any improvement in the wound healing since surgery. Her  has been packing the wound and changing the dressing.  Patient reports feeling well. Denies wound drainage, odor. Denies current erythema, swelling, pain- stating these symptoms did improve with the recent course of Bactrim.    11/26/19: Clinic visit with MEE Mcdermott, FNP-BC. Patient reports feeling well, pt is happy with wound progress reporting that wound is smaller. Denies wound drainage, odor. Denies erythema, swelling, pain.  Her  has been packing the wound and changing the dressing. She states that she wants to get this wound healed as quickly as possible.  She is very busy stating that she has 3 jobs and 5 kids. She has received wound vac, but unfortunately given the holiday  we can not place today as there are no appts available Thursday or Friday.  Will schedule for wound vac placement and FU appts for next week.     12/9/2019: Clinic visit with MEE Cantu. Patient states that they are feeling well today.  Patient denies fever, chills, nausea, vomiting, lightheadedness, dizziness, shortness of breath and chest pain.  The wound has decreased in size and has good epithelialized tissue.  Depth has significantly decreased and wound VAC is no longer necessary at this time.  Patient instructed to return VAC to [facility].  Patient is pleased with progression of wound at this time.    12/17/2019: Clinic visit with MEE Cantu. Patient states that they are feeling well today.  Patient denies fever, chills, nausea, vomiting, lightheadedness, dizziness, shortness of breath and chest pain. Wound is decreasing in size.     REVIEW OF SYSTEMS:   Review of Systems   Constitutional: Negative for chills and fever.   Respiratory: Negative for shortness of breath.    Cardiovascular: Negative for chest pain, palpitations and leg swelling.   Gastrointestinal: Negative for abdominal pain, diarrhea, nausea and vomiting.   Skin: Negative for itching.   Neurological: Negative for dizziness and headaches.   Psychiatric/Behavioral: Negative for depression.       PHYSICAL EXAMINATION:   /66   Pulse 77   Temp 36.2 °C (97.2 °F)   Resp 18   SpO2 96%     Physical Exam   Constitutional: She is oriented to person, place, and time and well-developed, well-nourished, and in no distress. No distress.   HENT:   Head: Normocephalic and atraumatic.   Eyes: Pupils are equal, round, and reactive to light. Conjunctivae and EOM are normal.   Neck: Normal range of motion. Neck supple.   Cardiovascular: Normal rate.   Pulmonary/Chest: Effort normal. No respiratory distress.   Abdominal: Soft. She exhibits no distension. There is no tenderness.   Musculoskeletal: Normal range of motion.   Neurological: She  is alert and oriented to person, place, and time.   Skin: Skin is warm and dry.   Psychiatric: Mood, memory, affect and judgment normal.       WOUND ASSESSMENT     Wound 11/12/19 Full Thickness Wound Abdomen -Left Abdomen (Active)   Wound Image    12/17/2019 10:00 AM   Site Assessment Red 12/17/2019 10:00 AM   Bhumi-wound Assessment Scar tissue 12/17/2019 10:00 AM   Margins Attached edges 12/17/2019 10:00 AM   Wound Length (cm) 0.2 cm 12/17/2019 10:00 AM   Wound Width (cm) 0.2 cm 12/17/2019 10:00 AM   Wound Depth (cm) 0.1 cm 12/17/2019 10:00 AM   Wound Surface Area (cm^2) 0.04 cm^2 12/17/2019 10:00 AM   Post Wound Length (cm) 0.2 cm 12/17/2019 10:00 AM    Post Wound Width (cm) 0.4 cm 12/17/2019 10:00 AM   Post Wound Depth (cm) 0.1 cm 12/17/2019 10:00 AM   Post Wound Surface Area (cm^2) 0.08 cm^2 12/17/2019 10:00 AM   Tunneling 0 cm 12/9/2019 10:00 AM   Undermining 0 cm 12/9/2019 10:00 AM   Closure Secondary intention 12/9/2019 10:00 AM   Drainage Amount Small 12/17/2019 10:00 AM   Drainage Description Serosanguineous 12/17/2019 10:00 AM   Non-staged Wound Description Full thickness 12/17/2019 10:00 AM   Treatments Cleansed;Pharmaceutical agent;Other (Comment) 12/17/2019 10:00 AM   Cleansing Normal Saline Irrigation 12/17/2019 10:00 AM   Periwound Protectant Skin Protectant wipes to Periwound 12/17/2019 10:00 AM   Dressing Options Collagen Dressing;Nonadhesive Foam;Transparent Film 12/17/2019 10:00 AM   Dressing Cleansing/Solutions Normal Saline 12/9/2019 10:00 AM   Dressing Changed Changed 12/9/2019 10:00 AM   Dressing Status Clean;Dry;Intact 12/9/2019 10:00 AM   Dressing Change Frequency Every 72 hrs 12/9/2019 10:00 AM   WOUND NURSE ONLY - Odor None 12/17/2019 10:00 AM   WOUND NURSE ONLY - Exposed Structures None 12/17/2019 10:00 AM   WOUND NURSE ONLY - Tissue Type and Percentage 100% red viable 12/17/2019 10:00 AM        PROCEDURE:   -2% viscous lidocaine applied topically to wound bed for approximately 5 minutes  prior to debridement  -Curette used to debride wound bed.  Excisional debridement was performed to remove devitalized tissue until healthy, bleeding tissue was visualized.   Entire surface of wound, 0.8 cm2 debrided.  Tissue debrided into the subcutaneous  layer.    -Bleeding controlled with manual pressure.    -Wound care completed by wound RN, refer to flowsheet  -Patient tolerated the procedure well, without c/o pain or discomfort.       Pertinent Labs and Diagnostics:    Labs:     A1c:   Lab Results   Component Value Date/Time    HBA1C 5.8 (H) 03/13/2018 01:59 PM             ASSESSMENT AND PLAN:   1. Non-healing surgical wound, initial encounter  Comments: s/p gastric band surgery 10 years ago in [place], subsequent removal of band 10/7/19 by Dr Levine    12/9/219  Excisional debridement of wound in clinic today, medically necessary to promote wound healing. Pt tolerated well.   -Debridement, Epiona and foam dressing seem to be decreasing the size of wound will continue with this management.     Wound care: Collagen packing to accelerate granulation,foam cover dressing, transparent film for securement    2.  Pain associated with wound  -2% viscous lidocaine applied topically to wound bed for approximately 5 minutes prior to debridement  -Patient tolerated procedure today with no complaints of discomfort.      PATIENT EDUCATION  - Importance of adequate nutrition for wound healing  -Advised to go to ER for any increased redness, swelling, drainage, or odor, or if patient develops fever, chills, nausea or vomiting.       Please note that this note may have been created using voice recognition software. I have worked with technical experts from [facility] [employer] to optimize the interface.  I have made every reasonable attempt to correct obvious errors, but there may be errors of grammar and possibly content that I did not discover before finalizing the note.    N

## 2019-12-26 ENCOUNTER — OFFICE VISIT (OUTPATIENT)
Dept: RETAIL CLINIC | Facility: CLINIC | Age: 39
End: 2019-12-26

## 2019-12-26 VITALS
TEMPERATURE: 98.7 F | WEIGHT: 93 LBS | BODY MASS INDEX: 16.47 KG/M2 | RESPIRATION RATE: 17 BRPM | HEART RATE: 73 BPM | DIASTOLIC BLOOD PRESSURE: 60 MMHG | SYSTOLIC BLOOD PRESSURE: 98 MMHG | OXYGEN SATURATION: 98 %

## 2019-12-26 DIAGNOSIS — R05.9 COUGH: Primary | ICD-10-CM

## 2019-12-26 DIAGNOSIS — F17.200 SMOKING ADDICTION: ICD-10-CM

## 2019-12-26 PROCEDURE — 99213 OFFICE O/P EST LOW 20 MIN: CPT | Performed by: NURSE PRACTITIONER

## 2019-12-26 RX ORDER — GUAIFENESIN 600 MG/1
1200 TABLET, EXTENDED RELEASE ORAL 2 TIMES DAILY
Qty: 20 TABLET | Refills: 0 | Status: SHIPPED | OUTPATIENT
Start: 2019-12-26 | End: 2019-12-31

## 2019-12-26 RX ORDER — ALBUTEROL SULFATE 90 UG/1
2 AEROSOL, METERED RESPIRATORY (INHALATION) EVERY 6 HOURS PRN
Qty: 1 INHALER | Refills: 0 | Status: SHIPPED | OUTPATIENT
Start: 2019-12-26 | End: 2020-01-02

## 2019-12-26 RX ORDER — FLUTICASONE PROPIONATE 50 MCG
1 SPRAY, SUSPENSION (ML) NASAL DAILY
Qty: 1 BOTTLE | Refills: 0 | Status: SHIPPED | OUTPATIENT
Start: 2019-12-26 | End: 2020-01-09

## 2019-12-26 RX ORDER — CETIRIZINE HYDROCHLORIDE 10 MG/1
10 TABLET ORAL DAILY
Qty: 30 TABLET | Refills: 0 | Status: SHIPPED | OUTPATIENT
Start: 2019-12-26

## 2019-12-26 RX ORDER — OMEPRAZOLE 20 MG/1
CAPSULE, DELAYED RELEASE ORAL
COMMUNITY
Start: 2019-11-07

## 2019-12-26 NOTE — PATIENT INSTRUCTIONS
Smoking Tobacco Information, Adult  Smoking tobacco can be harmful to your health. Tobacco contains a poisonous (toxic), colorless chemical called nicotine. Nicotine is addictive. It changes the brain and can make it hard to stop smoking. Tobacco also has other toxic chemicals that can hurt your body and raise your risk of many cancers.  How can smoking tobacco affect me?  Smoking tobacco puts you at risk for:  · Cancer. Smoking is most commonly associated with lung cancer, but can also lead to cancer in other parts of the body.  · Chronic obstructive pulmonary disease (COPD). This is a long-term lung condition that makes it hard to breathe. It also gets worse over time.  · High blood pressure (hypertension), heart disease, stroke, or heart attack.  · Lung infections, such as pneumonia.  · Cataracts. This is when the lenses in the eyes become clouded.  · Digestive problems. This may include peptic ulcers, heartburn, and gastroesophageal reflux disease (GERD).  · Oral health problems, such as gum disease and tooth loss.  · Loss of taste and smell.  Smoking can affect your appearance by causing:  · Wrinkles.  · Yellow or stained teeth, fingers, and fingernails.  Smoking tobacco can also affect your social life, because:  · It may be challenging to find places to smoke when away from home. Many workplaces, restaurants, hotels, and public places are tobacco-free.  · Smoking is expensive. This is due to the cost of tobacco and the long-term costs of treating health problems from smoking.  · Secondhand smoke may affect those around you. Secondhand smoke can cause lung cancer, breathing problems, and heart disease. Children of smokers have a higher risk for:  ? Sudden infant death syndrome (SIDS).  ? Ear infections.  ? Lung infections.  If you currently smoke tobacco, quitting now can help you:  · Lead a longer and healthier life.  · Look, smell, breathe, and feel better over time.  · Save money.  · Protect others from the  harms of secondhand smoke.  What actions can I take to prevent health problems?  Quit smoking    · Do not start smoking. Quit if you already do.  · Make a plan to quit smoking and commit to it. Look for programs to help you and ask your health care provider for recommendations and ideas.  · Set a date and write down all the reasons you want to quit.  · Let your friends and family know you are quitting so they can help and support you. Consider finding friends who also want to quit. It can be easier to quit with someone else, so that you can support each other.  · Talk with your health care provider about using nicotine replacement medicines to help you quit, such as gum, lozenges, patches, sprays, or pills.  · Do not replace cigarette smoking with electronic cigarettes, which are commonly called e-cigarettes. The safety of e-cigarettes is not known, and some may contain harmful chemicals.  · If you try to quit but return to smoking, stay positive. It is common to slip up when you first quit, so take it one day at a time.  · Be prepared for cravings. When you feel the urge to smoke, chew gum or suck on hard candy.  Lifestyle  · Stay busy and take care of your body.  · Drink enough fluid to keep your urine pale yellow.  · Get plenty of exercise and eat a healthy diet. This can help prevent weight gain after quitting.  · Monitor your eating habits. Quitting smoking can cause you to have a larger appetite than when you smoke.  · Find ways to relax. Go out with friends or family to a movie or a restaurant where people do not smoke.  · Ask your health care provider about having regular tests (screenings) to check for cancer. This may include blood tests, imaging tests, and other tests.  · Find ways to manage your stress, such as meditation, yoga, or exercise.  Where to find support  To get support to quit smoking, consider:  · Asking your health care provider for more information and resources.  · Taking classes to learn  more about quitting smoking.  · Looking for local organizations that offer resources about quitting smoking.  · Joining a support group for people who want to quit smoking in your local community.  · Calling the smokefree.gov counselor helpline: 1-800-Quit-Now (1-471.869.9192)  Where to find more information  You may find more information about quitting smoking from:  · HelpGuide.org: www.helpguide.org  · Smokefree.gov: smokefree.gov  · American Lung Association: www.lung.org  Contact a health care provider if you:  · Have problems breathing.  · Notice that your lips, nose, or fingers turn blue.  · Have chest pain.  · Are coughing up blood.  · Feel faint or you pass out.  · Have other health changes that cause you to worry.  Summary  · Smoking tobacco can negatively affect your health, the health of those around you, your finances, and your social life.  · Do not start smoking. Quit if you already do. If you need help quitting, ask your health care provider.  · Think about joining a support group for people who want to quit smoking in your local community. There are many effective programs that will help you to quit this behavior.  This information is not intended to replace advice given to you by your health care provider. Make sure you discuss any questions you have with your health care provider.  Document Released: 01/02/2018 Document Revised: 02/06/2019 Document Reviewed: 01/02/2018  [employer] Interactive Patient Education © 2019 [employer]  Cough, Adult    A cough helps to clear your throat and lungs. A cough may last only 2-3 weeks (acute), or it may last longer than 8 weeks (chronic). Many different things can cause a cough. A cough may be a sign of an illness or another medical condition.  Follow these instructions at home:  · Pay attention to any changes in your cough.  · Take medicines only as told by your doctor.  ? If you were prescribed an antibiotic medicine, take it as told by your doctor. Do not  stop taking it even if you start to feel better.  ? Talk with your doctor before you try using a cough medicine.  · Drink enough fluid to keep your pee (urine) clear or pale yellow.  · If the air is dry, use a cold steam vaporizer or humidifier in your home.  · Stay away from things that make you cough at work or at home.  · If your cough is worse at night, try using extra pillows to raise your head up higher while you sleep.  · Do not smoke, and try not to be around smoke. If you need help quitting, ask your doctor.  · Do not have caffeine.  · Do not drink alcohol.  · Rest as needed.  Contact a doctor if:  · You have new problems (symptoms).  · You cough up yellow fluid (pus).  · Your cough does not get better after 2-3 weeks, or your cough gets worse.  · Medicine does not help your cough and you are not sleeping well.  · You have pain that gets worse or pain that is not helped with medicine.  · You have a fever.  · You are losing weight and you do not know why.  · You have night sweats.  Get help right away if:  · You cough up blood.  · You have trouble breathing.  · Your heartbeat is very fast.  This information is not intended to replace advice given to you by your health care provider. Make sure you discuss any questions you have with your health care provider.  Document Released: 08/30/2012 Document Revised: 05/25/2017 Document Reviewed: 02/24/2016  [employer] Interactive Patient Education © 2019 [employer]

## 2019-12-26 NOTE — PROGRESS NOTES
URI      Subjective   Donna London is a 39 y.o. female.     URI    This is a recurrent problem. Episode onset: 2 days ago  The problem has been gradually worsening. Maximum temperature: Tactile  Associated symptoms include congestion, coughing, ear pain (left ), headaches, nausea, a sore throat and wheezing. Pertinent negatives include no abdominal pain, diarrhea, rash, rhinorrhea, sinus pain, sneezing or vomiting. Treatments tried: Increase fluids; Advil (100 mg) 2 nights ago  The treatment provided no relief.    Recently finished Bactrim (10/2019), Doxycyline (11/2019), Zithromax and Medrol for URI 12/2019 .      There is no problem list on file for this patient.      Allergies   Allergen Reactions   • Penicillins Hives        Current Outpatient Medications on File Prior to Visit   Medication Sig Dispense Refill   • buprenorphine-naloxone (SUBOXONE) 8-2 MG film film DISSOLVE 3/4 FILM UNT QD  0   • ibuprofen (ADVIL,MOTRIN) 600 MG tablet Take 1 tablet by mouth Every 6 (Six) Hours As Needed for Moderate Pain , Fever or Headache. 40 tablet 0   • omeprazole (priLOSEC) 20 MG capsule      • [DISCONTINUED] chlorhexidine (PERIDEX) 0.12 % solution Apply 15 mL to the mouth or throat 2 (Two) Times a Day. Swish and spit 473 mL 0     No current facility-administered medications on file prior to visit.        Past Medical History:   Diagnosis Date   • Allergic    • Anxiety    • COPD (chronic obstructive pulmonary disease) (CMS/[facility])    • Hypertension    • Opioid abuse, in remission (CMS/[facility])    • Oral herpes simplex infection    • Scoliosis    • Stomach ulcer    • Urinary tract infection        Past Surgical History:   Procedure Laterality Date   • BREAST AUGMENTATION         Family History   Problem Relation Age of Onset   • Heart disease Mother    • Diabetes Mother    • Lung disease Mother    • Skin cancer Father    • No Known Problems Sister        Social History     Socioeconomic History   • Marital status:       Spouse name: Not on file   • Number of children: Not on file   • Years of education: Not on file   • Highest education level: Not on file   Tobacco Use   • Smoking status: Current Every Day Smoker     Packs/day: 1.00     Years: 20.00     Pack years: 20.00     Types: Cigarettes   • Smokeless tobacco: Never Used   • Tobacco comment: cutting back   Substance and Sexual Activity   • Alcohol use: Never     Frequency: Never   • Drug use: Not Currently     Types: Heroin, Hydrocodone     Comment: Last use:  8/24/2013   • Sexual activity: Not Currently     Birth control/protection: Abstinence       Travel:  No recent travel within the last 21 days outside the [place] Denies recent travel to one of the following [place]ries:  [place], [place], [place], or [place].  Denies contact with anyone who has traveled to one of the following [place]  Countries: [place], [place], [place], or [place] within the last 21 days and is known or suspected to have Ebola.  Denies having had any contact with the human remains, blood or any bodily fluids of someone who is known or suspected to have Ebola within the last 21 days.     OB History    None         Review of Systems   Constitutional: Positive for chills, diaphoresis and fever (tactile ).   HENT: Positive for congestion, ear pain (left ), postnasal drip, sinus pressure, sore throat and voice change. Negative for dental problem, ear discharge, mouth sores, nosebleeds, rhinorrhea, sinus pain and sneezing.    Respiratory: Positive for cough, chest tightness, shortness of breath and wheezing.    Gastrointestinal: Positive for nausea. Negative for abdominal pain, diarrhea and vomiting.   Musculoskeletal: Positive for myalgias.   Skin: Negative for rash.   Neurological: Positive for dizziness and headaches.       BP 98/60 (BP Location: Right arm, Patient Position: Sitting, Cuff Size: Small Adult)   Pulse 73   Temp 98.7 °F (37.1 °C) (Temporal)   Resp 17   Wt 42.2 kg (93 lb)    LMP 12/02/2019 (Exact Date)   SpO2 98%   Breastfeeding No   BMI 16.47 kg/m²     Objective   Physical Exam   Constitutional: She is oriented to person, place, and time. She appears well-developed and well-nourished. She is cooperative. She appears ill (mild ). No distress.   thin   HENT:   Head: Normocephalic.   Right Ear: Tympanic membrane, external ear and ear canal normal.   Left Ear: Tympanic membrane, external ear and ear canal normal.   Nose: Right sinus exhibits no maxillary sinus tenderness and no frontal sinus tenderness. Left sinus exhibits no maxillary sinus tenderness and no frontal sinus tenderness.   Mouth/Throat: Uvula is midline. Mucous membranes are dry. No posterior oropharyngeal edema or posterior oropharyngeal erythema. Tonsils are 1+ on the right. Tonsils are 1+ on the left. No tonsillar exudate.   Mild nasal congestion, clear PND    Cardiovascular: Normal rate, regular rhythm and normal heart sounds.   Pulmonary/Chest: Effort normal and breath sounds normal. No stridor. She has no decreased breath sounds. She has no wheezes. She has no rhonchi. She has no rales.   Lymphadenopathy:        Head (right side): Tonsillar adenopathy present.        Head (left side): Tonsillar adenopathy present.     She has no cervical adenopathy.   Neurological: She is alert and oriented to person, place, and time.   Skin: Skin is warm, dry and intact. No rash noted.       Assessment/Plan   Donna was seen today for uri.    Diagnoses and all orders for this visit:    Cough  -     albuterol sulfate  (90 Base) MCG/ACT inhaler; Inhale 2 puffs Every 6 (Six) Hours As Needed for Wheezing or Shortness of Air for up to 7 days.  -     guaiFENesin (MUCINEX) 600 MG 12 hr tablet; Take 2 tablets by mouth 2 (Two) Times a Day for 5 days.  -     cetirizine (zyrTEC) 10 MG tablet; Take 1 tablet by mouth Daily.  -     fluticasone (FLONASE) 50 MCG/ACT nasal spray; 1 spray into the nostril(s) as directed by provider Daily for  14 days.    Smoking addiction  -     Ambulatory Referral to Family Practice      Rest, increase water intake, neti pot PRN, steam showers, humidifier in room. Tylenol and/or Motrin for pain/fever. Follow up with PCP if symptoms worsen/do not improve.  Patient verbalized understanding of instructions given.  Visit summary provided.  Questions/concerns addressed today.       Return if symptoms worsen or fail to improve with PCP .

## 2019-12-27 ENCOUNTER — APPOINTMENT (OUTPATIENT)
Dept: WOUND CARE | Facility: MEDICAL CENTER | Age: 39
End: 2019-12-27
Attending: COLON & RECTAL SURGERY
Payer: MEDICAID

## 2019-12-30 ENCOUNTER — OFFICE VISIT (OUTPATIENT)
Dept: WOUND CARE | Facility: MEDICAL CENTER | Age: 39
End: 2019-12-30
Attending: COLON & RECTAL SURGERY
Payer: MEDICAID

## 2019-12-30 VITALS
DIASTOLIC BLOOD PRESSURE: 78 MMHG | SYSTOLIC BLOOD PRESSURE: 127 MMHG | RESPIRATION RATE: 20 BRPM | TEMPERATURE: 97.1 F | HEART RATE: 80 BPM | OXYGEN SATURATION: 97 %

## 2019-12-30 DIAGNOSIS — T81.89XA NON-HEALING SURGICAL WOUND, INITIAL ENCOUNTER: Primary | ICD-10-CM

## 2019-12-30 PROCEDURE — 99212 OFFICE O/P EST SF 10 MIN: CPT | Performed by: NURSE PRACTITIONER

## 2019-12-30 PROCEDURE — 99212 OFFICE O/P EST SF 10 MIN: CPT

## 2019-12-30 ASSESSMENT — ENCOUNTER SYMPTOMS
CHILLS: 0
DIZZINESS: 0
NAUSEA: 0
ABDOMINAL PAIN: 0
PALPITATIONS: 0
FEVER: 0
HEADACHES: 0
VOMITING: 0
SHORTNESS OF BREATH: 0
DEPRESSION: 0
DIARRHEA: 0

## 2019-12-30 ASSESSMENT — PAIN SCALES - GENERAL: PAINLEVEL: NO PAIN

## 2019-12-30 NOTE — PROGRESS NOTES
Provider Encounter- Full Thickness wound    HISTORY OF PRESENT ILLNESS  Wound History:    START OF CARE IN CLINIC: 11/12/2019    REFERRING PROVIDER: Dr. Murray Levine     WOUND- Full Thickness Wound   LOCATION: Left Abdomen   HISTORY: Patient is a 39 year old female with an abdominal wound. She had gastric band removal surgery with Dr. Levine on 10/7/2019, and was instructed to pack her wound with strip packing every day. Approx. 2 weeks after surgery she reports increased pain and concern of infection. She was first treated with a course of Keflex with no improvement. She was subsequently prescribed Bactrim by Dr. Levine. Last dose today 11/19/19. The patient states that her wound has not healed at all since her surgery, so she was referred to this clinic for evaluation and treatment.    Pertinent Medical History: obesity s/p gastric band surgery 10 years ago in [place], subsequent removal of band 10/7/19.     TOBACCO USE: Never smoker    Patient's problem list, allergies, and current medications reviewed and updated in Epic    Interval History:  11/19/2019: Initial provider visit with MEE Mcdermott. Pt reports feeling well. She states that she has not really noticed any improvement in the wound healing since surgery. Her  has been packing the wound and changing the dressing.  Patient reports feeling well. Denies wound drainage, odor. Denies current erythema, swelling, pain- stating these symptoms did improve with the recent course of Bactrim.    11/26/19: Clinic visit with MEE Mcdermott, FNP-BC. Patient reports feeling well, pt is happy with wound progress reporting that wound is smaller. Denies wound drainage, odor. Denies erythema, swelling, pain.  Her  has been packing the wound and changing the dressing. She states that she wants to get this wound healed as quickly as possible.  She is very busy stating that she has 3 jobs and 5 kids. She has received wound vac, but unfortunately given the holiday  we can not place today as there are no appts available Thursday or Friday.  Will schedule for wound vac placement and FU appts for next week.     12/9/2019: Clinic visit with MEE Cantu. Patient states that they are feeling well today.  Patient denies fever, chills, nausea, vomiting, lightheadedness, dizziness, shortness of breath and chest pain.  The wound has decreased in size and has good epithelialized tissue.  Depth has significantly decreased and wound VAC is no longer necessary at this time.  Patient instructed to return VAC to [facility].  Patient is pleased with progression of wound at this time.    12/17/2019: Clinic visit with MEE Cantu. Patient states that they are feeling well today.  Patient denies fever, chills, nausea, vomiting, lightheadedness, dizziness, shortness of breath and chest pain. Wound is decreasing in size.     12/30/2019: Clinic visit with MEE Cantu. Patient states that they are feeling well today.  Patient denies fever, chills, nausea, vomiting, lightheadedness, dizziness, shortness of breath and chest pain. Patient's wound is 100% closed with complete epithelialization.  Patient DC'd from [facility].     REVIEW OF SYSTEMS:   Review of Systems   Constitutional: Negative for chills and fever.   Respiratory: Negative for shortness of breath.    Cardiovascular: Negative for chest pain, palpitations and leg swelling.   Gastrointestinal: Negative for abdominal pain, diarrhea, nausea and vomiting.   Skin: Negative for itching.   Neurological: Negative for dizziness and headaches.   Psychiatric/Behavioral: Negative for depression.       PHYSICAL EXAMINATION:   /78   Pulse 80   Temp 36.2 °C (97.1 °F)   Resp 20   SpO2 97%     Physical Exam   Constitutional: She is oriented to person, place, and time and well-developed, well-nourished, and in no distress. No distress.   HENT:   Head: Normocephalic and atraumatic.   Eyes: Pupils are equal, round, and reactive to  light. Conjunctivae and EOM are normal.   Neck: Normal range of motion. Neck supple.   Cardiovascular: Normal rate.   Pulmonary/Chest: Effort normal. No respiratory distress.   Abdominal: Soft. She exhibits no distension. There is no tenderness.   Musculoskeletal: Normal range of motion.   Neurological: She is alert and oriented to person, place, and time.   Skin: Skin is warm and dry.   Psychiatric: Mood, memory, affect and judgment normal.       WOUND ASSESSMENT     Wound 11/12/19 Full Thickness Wound Abdomen -Left Abdomen (Active)   Wound Image    12/30/2019 10:30 AM   Site Assessment Dry 12/30/2019 10:30 AM   Bhumi-wound Assessment Scar tissue 12/30/2019 10:30 AM   Margins Attached edges 12/17/2019 10:00 AM   Wound Length (cm) 0 cm 12/30/2019 10:30 AM   Wound Width (cm) 0 cm 12/30/2019 10:30 AM   Wound Depth (cm) 0 cm 12/30/2019 10:30 AM   Wound Surface Area (cm^2) 0 cm^2 12/30/2019 10:30 AM   Post Wound Length (cm) 0 cm 12/30/2019 10:30 AM    Post Wound Width (cm) 0 cm 12/30/2019 10:30 AM   Post Wound Depth (cm) 0 cm 12/30/2019 10:30 AM   Post Wound Surface Area (cm^2) 0 cm^2 12/30/2019 10:30 AM   Tunneling 0 cm 12/30/2019 10:30 AM   Undermining 0 cm 12/30/2019 10:30 AM   Closure Secondary intention 12/9/2019 10:00 AM   Drainage Amount None 12/30/2019 10:30 AM   Drainage Description Serosanguineous 12/17/2019 10:00 AM   Non-staged Wound Description Not applicable 12/30/2019 10:30 AM   Treatments Cleansed;Pharmaceutical agent;Other (Comment) 12/17/2019 10:00 AM   Cleansing Approved Wound Cleanser 12/30/2019 10:30 AM   Periwound Protectant Skin Protectant wipes to Periwound 12/17/2019 10:00 AM   Dressing Options Open to Air 12/30/2019 10:30 AM   Dressing Cleansing/Solutions Normal Saline 12/9/2019 10:00 AM   Dressing Changed Changed 12/9/2019 10:00 AM   Dressing Status Clean;Dry;Intact 12/9/2019 10:00 AM   Dressing Change Frequency Every 72 hrs 12/9/2019 10:00 AM   WOUND NURSE ONLY - Odor None 12/17/2019 10:00 AM    WOUND NURSE ONLY - Exposed Structures None 12/17/2019 10:00 AM   WOUND NURSE ONLY - Tissue Type and Percentage 100% epithelialized  12/30/2019 10:30 AM               PROCEDURE:   Wound site cleansed with NS and gauze.  Wound has closed.  Patient discharged from [facility].     Pertinent Labs and Diagnostics:    Labs:     A1c:   Lab Results   Component Value Date/Time    HBA1C 5.8 (H) 03/13/2018 01:59 PM             ASSESSMENT AND PLAN:   1. Non-healing surgical wound, initial encounter  Comments: s/p gastric band surgery 10 years ago in [place], subsequent removal of band 10/7/19 by Dr Levine    Wound Closed pt discharged.       Please note that this note may have been created using voice recognition software. I have worked with technical experts from [employer] to optimize the interface.  I have made every reasonable attempt to correct obvious errors, but there may be errors of grammar and possibly content that I did not discover before finalizing the note.    N

## 2020-09-22 ENCOUNTER — NON-PROVIDER VISIT (OUTPATIENT)
Dept: HEALTH INFORMATION MANAGEMENT | Facility: MEDICAL CENTER | Age: 40
End: 2020-09-22
Payer: COMMERCIAL

## 2020-09-22 VITALS — BODY MASS INDEX: 40.18 KG/M2 | WEIGHT: 256 LBS | HEIGHT: 67 IN

## 2020-09-22 DIAGNOSIS — E66.01 MORBID (SEVERE) OBESITY DUE TO EXCESS CALORIES (HCC): ICD-10-CM

## 2020-09-22 DIAGNOSIS — Z98.84 HISTORY OF LAPAROSCOPIC ADJUSTABLE GASTRIC BANDING: ICD-10-CM

## 2020-09-22 PROCEDURE — 97802 MEDICAL NUTRITION INDIV IN: CPT | Performed by: DIETITIAN, REGISTERED

## 2020-09-22 NOTE — PROGRESS NOTES
"9/22/2020   Dillon Poon M.D.  40 y.o.  Time in/out: 9:00-10:00      Anthropometrics/Objective  Vitals:    09/22/20 1008   Weight: 116.1 kg (256 lb)   Height: 1.702 m (5' 7\")       Body mass index is Body mass index is 40.1 kg/m².   Stated Goal Weight: 180 lbs  Estimated Caloric needs 1869 Kcal (MSJ)   See comprehensive patient history form for further information     Subjective:  - working towards goal of bariatric surgery  - had lap band in the past   - removed 2019 d/t complications  - works three jobs  - was taking phent and topiramate  - long periods of time between meals and snacks  - has to eat fast at work  - takes PP shake for breakfast  - accompanied by partner, Hipolito       Nutrition Diagnosis (PES Statement)  Problem (Nutrition diagnosis)  Clinical: Class 3 Obesity (BMI >40)     Etiology(Addresses the cause,contributing factors)  Excessive energy intake     Signs/Symptoms (Address observations and stated info: subjective and objective data)  Reports of observations of high caloric density or large portions of food/beverages and interest in bariatric surgery     Client history:  Condition(s) associated with a diagnosis or treatment (specify): morbid obesity    Biochemical data, medical test and procedures  Lab Results   Component Value Date/Time    HBA1C 5.8 (H) 03/13/2018 01:59 PM     Lab Results   Component Value Date/Time    POCGLUCOSE 106 (H) 03/14/2018 12:04 PM     No results found for: CHOLSTRLTOT, LDL, HDL, TRIGLYCERIDE        Nutrition Intervention     Meal and Snack  Recommend a general/healthful diet  Bariatric meal recommendations pre and post surgery     Comprehensive Nutrition education Instruction or training leading to in-depth nutrition related knowledge about:  Benefits to following meal plan, Meal timing and spacing and general diet for bariatric surgery   · Basic physiology of metabolism  · Pros/cons of bariatric surgery - this is a tool and not a “quick fix”   · Dumping syndrome:  " what it is and how to avoid it (if applicable)   · Importance of avoiding foods high in fat/sugar  · Outline of the progression of the diet/stages of diet after surgery  · The importance of eating meals/snacks at appropriate times  · Chewing food well  · Adequate fluid intake - sip fluids throughout the day and avoid with meals  · Lifestyle modifications - the importance of healthful food choices and exercise  · Basic concept of emotional vs. physical hunger        Monitoring & Evaluation Plan     Behavioral-Environmental:  Behavior: Start tracking food to accustom to portion sizes     Food / Nutrient Intake:  Food Intake:  1. Eat 3 meals and 3 small snacks daily   2. Slow down when eating.  3. Chew your food well.  4. Use smaller plates/bowls/utensils to help eat smaller portions.  5. Do not eat in front of the TV nor read while eating; focus on your body and the food.  6. Decrease your intake of high fat/high sugar foods.  7. Eat more fruits and vegetables.  8. Use sugar substitutes like Stevia or Splenda.  9. Eat protein foods first   10. Start taking a Multivitamin with minerals daily      Fluid Intake:  1. Practice not drinking fluids 30 - 60 minutes before and after meals.  2. Do not drink from a straw.  3. Stop drinking sugar-sweetened beverages.  4. Start weaning off of caffeine and carbonated beverages.        5. Practice sipping liquids; not gulping      Physical Signs / Symptoms:  Reaching weight goal provided by physician with the assistance of bariatric surgery     Assessment Notes: Kayla was receptive to the bariatric material provided to her at this visit. We discussed the topics listed above in depth for bariatric surgery.  After review of Kayla's patient history form and meal journal, we discussed habits such as decreasing soda intake, eating slower, decreasing caffeine in general, being more mindful of foods brought into the home, meal frequency and frequency of water intake that can be  improved. Recommended patient to not skip meals as this can potentially be detrimental after surgery. If Kayla follows the physician's recommendations, she will make a great candidate for bariatric surgery.      Bernadine Rios RD, ABRAHAM [email or web address]   [phone]

## 2021-02-03 ENCOUNTER — PRE-ADMISSION TESTING (OUTPATIENT)
Dept: ADMISSIONS | Facility: MEDICAL CENTER | Age: 41
DRG: 621 | End: 2021-02-03
Attending: SURGERY
Payer: COMMERCIAL

## 2021-02-03 ENCOUNTER — HOSPITAL ENCOUNTER (OUTPATIENT)
Dept: RADIOLOGY | Facility: MEDICAL CENTER | Age: 41
DRG: 621 | End: 2021-02-03
Attending: SURGERY | Admitting: SURGERY
Payer: COMMERCIAL

## 2021-02-03 DIAGNOSIS — Z01.812 PRE-OPERATIVE LABORATORY EXAMINATION: ICD-10-CM

## 2021-02-03 DIAGNOSIS — Z01.810 PRE-OPERATIVE CARDIOVASCULAR EXAMINATION: ICD-10-CM

## 2021-02-03 DIAGNOSIS — Z01.811 PRE-OPERATIVE RESPIRATORY EXAMINATION: ICD-10-CM

## 2021-02-03 LAB
25(OH)D3 SERPL-MCNC: 31 NG/ML (ref 30–100)
BASOPHILS # BLD AUTO: 0.4 % (ref 0–1.8)
BASOPHILS # BLD: 0.02 K/UL (ref 0–0.12)
CHOLEST SERPL-MCNC: 167 MG/DL (ref 100–199)
EKG IMPRESSION: NORMAL
EOSINOPHIL # BLD AUTO: 0.1 K/UL (ref 0–0.51)
EOSINOPHIL NFR BLD: 1.9 % (ref 0–6.9)
ERYTHROCYTE [DISTWIDTH] IN BLOOD BY AUTOMATED COUNT: 44.6 FL (ref 35.9–50)
FERRITIN SERPL-MCNC: 44.3 NG/ML (ref 10–291)
FOLATE SERPL-MCNC: 12.6 NG/ML
HCT VFR BLD AUTO: 43.2 % (ref 37–47)
HDLC SERPL-MCNC: 41 MG/DL
HGB BLD-MCNC: 14 G/DL (ref 12–16)
IMM GRANULOCYTES # BLD AUTO: 0.02 K/UL (ref 0–0.11)
IMM GRANULOCYTES NFR BLD AUTO: 0.4 % (ref 0–0.9)
IRON SATN MFR SERPL: 22 % (ref 15–55)
IRON SERPL-MCNC: 76 UG/DL (ref 40–170)
LDLC SERPL CALC-MCNC: 98 MG/DL
LYMPHOCYTES # BLD AUTO: 1.86 K/UL (ref 1–4.8)
LYMPHOCYTES NFR BLD: 34.7 % (ref 22–41)
MCH RBC QN AUTO: 29.7 PG (ref 27–33)
MCHC RBC AUTO-ENTMCNC: 32.4 G/DL (ref 33.6–35)
MCV RBC AUTO: 91.5 FL (ref 81.4–97.8)
MONOCYTES # BLD AUTO: 0.25 K/UL (ref 0–0.85)
MONOCYTES NFR BLD AUTO: 4.7 % (ref 0–13.4)
NEUTROPHILS # BLD AUTO: 3.11 K/UL (ref 2–7.15)
NEUTROPHILS NFR BLD: 57.9 % (ref 44–72)
NRBC # BLD AUTO: 0 K/UL
NRBC BLD-RTO: 0 /100 WBC
PLATELET # BLD AUTO: 227 K/UL (ref 164–446)
PMV BLD AUTO: 11.2 FL (ref 9–12.9)
PREALB SERPL-MCNC: 27.9 MG/DL (ref 18–38)
PTH-INTACT SERPL-MCNC: 53.5 PG/ML (ref 14–72)
RBC # BLD AUTO: 4.72 M/UL (ref 4.2–5.4)
TIBC SERPL-MCNC: 349 UG/DL (ref 250–450)
TRIGL SERPL-MCNC: 141 MG/DL (ref 0–149)
UIBC SERPL-MCNC: 273 UG/DL (ref 110–370)
VIT B12 SERPL-MCNC: 976 PG/ML (ref 211–911)
WBC # BLD AUTO: 5.4 K/UL (ref 4.8–10.8)

## 2021-02-03 PROCEDURE — 93010 ELECTROCARDIOGRAM REPORT: CPT | Performed by: INTERNAL MEDICINE

## 2021-02-03 PROCEDURE — 71046 X-RAY EXAM CHEST 2 VIEWS: CPT

## 2021-02-03 PROCEDURE — 82607 VITAMIN B-12: CPT

## 2021-02-03 PROCEDURE — 83550 IRON BINDING TEST: CPT

## 2021-02-03 PROCEDURE — 82306 VITAMIN D 25 HYDROXY: CPT

## 2021-02-03 PROCEDURE — 83970 ASSAY OF PARATHORMONE: CPT

## 2021-02-03 PROCEDURE — 82728 ASSAY OF FERRITIN: CPT

## 2021-02-03 PROCEDURE — 80061 LIPID PANEL: CPT

## 2021-02-03 PROCEDURE — 84134 ASSAY OF PREALBUMIN: CPT

## 2021-02-03 PROCEDURE — 83540 ASSAY OF IRON: CPT

## 2021-02-03 PROCEDURE — 85025 COMPLETE CBC W/AUTO DIFF WBC: CPT

## 2021-02-03 PROCEDURE — 93005 ELECTROCARDIOGRAM TRACING: CPT

## 2021-02-03 PROCEDURE — 36415 COLL VENOUS BLD VENIPUNCTURE: CPT

## 2021-02-03 PROCEDURE — 82746 ASSAY OF FOLIC ACID SERUM: CPT

## 2021-02-03 PROCEDURE — 84425 ASSAY OF VITAMIN B-1: CPT

## 2021-02-03 RX ORDER — PANTOPRAZOLE SODIUM 40 MG/1
40 TABLET, DELAYED RELEASE ORAL DAILY
COMMUNITY

## 2021-02-03 RX ORDER — ONDANSETRON 4 MG/1
4 TABLET, FILM COATED ORAL EVERY 4 HOURS PRN
Status: ON HOLD | COMMUNITY
End: 2021-02-09

## 2021-02-03 RX ORDER — FAMOTIDINE 40 MG/1
40 TABLET, FILM COATED ORAL DAILY
Status: ON HOLD | COMMUNITY
End: 2021-02-10

## 2021-02-06 ENCOUNTER — APPOINTMENT (OUTPATIENT)
Dept: ADMISSIONS | Facility: MEDICAL CENTER | Age: 41
DRG: 621 | End: 2021-02-06
Payer: COMMERCIAL

## 2021-02-06 ENCOUNTER — PRE-ADMISSION TESTING (OUTPATIENT)
Dept: ADMISSIONS | Facility: MEDICAL CENTER | Age: 41
DRG: 621 | End: 2021-02-06
Attending: SURGERY
Payer: COMMERCIAL

## 2021-02-06 DIAGNOSIS — Z01.812 PRE-OPERATIVE LABORATORY EXAMINATION: ICD-10-CM

## 2021-02-06 LAB
COVID ORDER STATUS COVID19: NORMAL
SARS-COV-2 RNA RESP QL NAA+PROBE: NOTDETECTED
SPECIMEN SOURCE: NORMAL
VIT B1 BLD-MCNC: 88 NMOL/L (ref 70–180)

## 2021-02-06 PROCEDURE — U0005 INFEC AGEN DETEC AMPLI PROBE: HCPCS

## 2021-02-06 PROCEDURE — C9803 HOPD COVID-19 SPEC COLLECT: HCPCS

## 2021-02-06 PROCEDURE — U0003 INFECTIOUS AGENT DETECTION BY NUCLEIC ACID (DNA OR RNA); SEVERE ACUTE RESPIRATORY SYNDROME CORONAVIRUS 2 (SARS-COV-2) (CORONAVIRUS DISEASE [COVID-19]), AMPLIFIED PROBE TECHNIQUE, MAKING USE OF HIGH THROUGHPUT TECHNOLOGIES AS DESCRIBED BY CMS-2020-01-R: HCPCS

## 2021-02-08 NOTE — PROGRESS NOTES
COVID-19 Pre-surgery screenin. Do you have an undiagnosed respiratory illness or symptoms such as coughing or sneezing? NO  (Yes/No)  a. Onset of Sx na  b. Acute vs. chronic respiratory illness nq    2. Do you have an unexplained fever greater than 100.4 degrees Fahrenheit or 38 degrees Celsius?     NO (Yes/No)    3. Have you had direct exposure to a patient who tested positive for Covid-19?    NO (Yes/No)    4. Have you had any loss of your sense of taste or smell? Have you had N/V or sore throat? NO    Patient has been informed of visitor policy and asked to wear a mask upon entering the hospital   YES (Yes/No)

## 2021-02-08 NOTE — OR NURSING
COVID-19 Pre-surgery screening:    Left message for Pt to call back for screening, also advised of mask/visitor policies.

## 2021-02-09 ENCOUNTER — HOSPITAL ENCOUNTER (INPATIENT)
Facility: MEDICAL CENTER | Age: 41
LOS: 1 days | DRG: 621 | End: 2021-02-10
Attending: SURGERY | Admitting: SURGERY
Payer: COMMERCIAL

## 2021-02-09 ENCOUNTER — ANESTHESIA (OUTPATIENT)
Dept: SURGERY | Facility: MEDICAL CENTER | Age: 41
DRG: 621 | End: 2021-02-09
Payer: COMMERCIAL

## 2021-02-09 ENCOUNTER — ANESTHESIA EVENT (OUTPATIENT)
Dept: SURGERY | Facility: MEDICAL CENTER | Age: 41
DRG: 621 | End: 2021-02-09
Payer: COMMERCIAL

## 2021-02-09 DIAGNOSIS — G89.18 POSTOPERATIVE PAIN: ICD-10-CM

## 2021-02-09 LAB
ALBUMIN SERPL BCP-MCNC: 4.2 G/DL (ref 3.2–4.9)
ALBUMIN/GLOB SERPL: 1.2 G/DL
ALP SERPL-CCNC: 84 U/L (ref 30–99)
ALT SERPL-CCNC: 79 U/L (ref 2–50)
ANION GAP SERPL CALC-SCNC: 15 MMOL/L (ref 7–16)
AST SERPL-CCNC: 53 U/L (ref 12–45)
BILIRUB SERPL-MCNC: 0.5 MG/DL (ref 0.1–1.5)
BUN SERPL-MCNC: 9 MG/DL (ref 8–22)
CALCIUM SERPL-MCNC: 9.6 MG/DL (ref 8.5–10.5)
CHLORIDE SERPL-SCNC: 102 MMOL/L (ref 96–112)
CO2 SERPL-SCNC: 21 MMOL/L (ref 20–33)
CREAT SERPL-MCNC: 0.66 MG/DL (ref 0.5–1.4)
GLOBULIN SER CALC-MCNC: 3.6 G/DL (ref 1.9–3.5)
GLUCOSE SERPL-MCNC: 93 MG/DL (ref 65–99)
HCG UR QL: NEGATIVE
POTASSIUM SERPL-SCNC: 3.8 MMOL/L (ref 3.6–5.5)
PROT SERPL-MCNC: 7.8 G/DL (ref 6–8.2)
SODIUM SERPL-SCNC: 138 MMOL/L (ref 135–145)

## 2021-02-09 PROCEDURE — 0BQT4ZZ REPAIR DIAPHRAGM, PERCUTANEOUS ENDOSCOPIC APPROACH: ICD-10-PCS | Performed by: SURGERY

## 2021-02-09 PROCEDURE — 501664 HCHG TUBING, FILTER STRYKER: Performed by: SURGERY

## 2021-02-09 PROCEDURE — 700111 HCHG RX REV CODE 636 W/ 250 OVERRIDE (IP)

## 2021-02-09 PROCEDURE — 81025 URINE PREGNANCY TEST: CPT

## 2021-02-09 PROCEDURE — A9270 NON-COVERED ITEM OR SERVICE: HCPCS | Performed by: SURGERY

## 2021-02-09 PROCEDURE — 700105 HCHG RX REV CODE 258: Performed by: SURGERY

## 2021-02-09 PROCEDURE — 700111 HCHG RX REV CODE 636 W/ 250 OVERRIDE (IP): Performed by: SURGERY

## 2021-02-09 PROCEDURE — 501838 HCHG SUTURE GENERAL: Performed by: SURGERY

## 2021-02-09 PROCEDURE — 160009 HCHG ANES TIME/MIN: Performed by: SURGERY

## 2021-02-09 PROCEDURE — 501570 HCHG TROCAR, SEPARATOR: Performed by: SURGERY

## 2021-02-09 PROCEDURE — 0DB64Z3 EXCISION OF STOMACH, PERCUTANEOUS ENDOSCOPIC APPROACH, VERTICAL: ICD-10-PCS | Performed by: SURGERY

## 2021-02-09 PROCEDURE — 500868 HCHG NEEDLE, SURGI(VARES): Performed by: SURGERY

## 2021-02-09 PROCEDURE — 80053 COMPREHEN METABOLIC PANEL: CPT

## 2021-02-09 PROCEDURE — 160002 HCHG RECOVERY MINUTES (STAT): Performed by: SURGERY

## 2021-02-09 PROCEDURE — 770006 HCHG ROOM/CARE - MED/SURG/GYN SEMI*

## 2021-02-09 PROCEDURE — 160035 HCHG PACU - 1ST 60 MINS PHASE I: Performed by: SURGERY

## 2021-02-09 PROCEDURE — 700101 HCHG RX REV CODE 250: Performed by: ANESTHESIOLOGY

## 2021-02-09 PROCEDURE — 700111 HCHG RX REV CODE 636 W/ 250 OVERRIDE (IP): Performed by: ANESTHESIOLOGY

## 2021-02-09 PROCEDURE — 8E0W4CZ ROBOTIC ASSISTED PROCEDURE OF TRUNK REGION, PERCUTANEOUS ENDOSCOPIC APPROACH: ICD-10-PCS | Performed by: SURGERY

## 2021-02-09 PROCEDURE — 160041 HCHG SURGERY MINUTES - EA ADDL 1 MIN LEVEL 4: Performed by: SURGERY

## 2021-02-09 PROCEDURE — 88305 TISSUE EXAM BY PATHOLOGIST: CPT

## 2021-02-09 PROCEDURE — 700111 HCHG RX REV CODE 636 W/ 250 OVERRIDE (IP): Performed by: PHYSICIAN ASSISTANT

## 2021-02-09 PROCEDURE — 160048 HCHG OR STATISTICAL LEVEL 1-5: Performed by: SURGERY

## 2021-02-09 PROCEDURE — 500522 HCHG ENDOSTITCH SUTURING DEVICE: Performed by: SURGERY

## 2021-02-09 PROCEDURE — 700105 HCHG RX REV CODE 258: Performed by: PHYSICIAN ASSISTANT

## 2021-02-09 PROCEDURE — 160029 HCHG SURGERY MINUTES - 1ST 30 MINS LEVEL 4: Performed by: SURGERY

## 2021-02-09 PROCEDURE — 501571 HCHG TROCAR, SEPARATOR 12X100: Performed by: SURGERY

## 2021-02-09 PROCEDURE — 501583 HCHG TROCAR, THRD CAN&SEAL 5X100: Performed by: SURGERY

## 2021-02-09 PROCEDURE — 700102 HCHG RX REV CODE 250 W/ 637 OVERRIDE(OP): Performed by: SURGERY

## 2021-02-09 PROCEDURE — 502570 HCHG PACK, GASTRIC BANDING: Performed by: SURGERY

## 2021-02-09 PROCEDURE — 160036 HCHG PACU - EA ADDL 30 MINS PHASE I: Performed by: SURGERY

## 2021-02-09 PROCEDURE — 700101 HCHG RX REV CODE 250: Performed by: SURGERY

## 2021-02-09 RX ORDER — KETOROLAC TROMETHAMINE 30 MG/ML
30 INJECTION, SOLUTION INTRAMUSCULAR; INTRAVENOUS EVERY 6 HOURS
Status: DISCONTINUED | OUTPATIENT
Start: 2021-02-09 | End: 2021-02-10 | Stop reason: HOSPADM

## 2021-02-09 RX ORDER — ONDANSETRON 4 MG/1
4 TABLET, ORALLY DISINTEGRATING ORAL EVERY 4 HOURS PRN
Status: DISCONTINUED | OUTPATIENT
Start: 2021-02-09 | End: 2021-02-10 | Stop reason: HOSPADM

## 2021-02-09 RX ORDER — LORAZEPAM 2 MG/ML
.5-1 INJECTION INTRAMUSCULAR EVERY 4 HOURS PRN
Status: DISCONTINUED | OUTPATIENT
Start: 2021-02-09 | End: 2021-02-10 | Stop reason: HOSPADM

## 2021-02-09 RX ORDER — MIDAZOLAM HYDROCHLORIDE 1 MG/ML
INJECTION INTRAMUSCULAR; INTRAVENOUS PRN
Status: DISCONTINUED | OUTPATIENT
Start: 2021-02-09 | End: 2021-02-09 | Stop reason: SURG

## 2021-02-09 RX ORDER — MORPHINE SULFATE 10 MG/ML
1-5 INJECTION, SOLUTION INTRAMUSCULAR; INTRAVENOUS
Status: DISCONTINUED | OUTPATIENT
Start: 2021-02-09 | End: 2021-02-10 | Stop reason: HOSPADM

## 2021-02-09 RX ORDER — ACETAMINOPHEN 10 MG/ML
1 INJECTION, SOLUTION INTRAVENOUS
Status: COMPLETED | OUTPATIENT
Start: 2021-02-09 | End: 2021-02-09

## 2021-02-09 RX ORDER — PROMETHAZINE HYDROCHLORIDE 25 MG/1
25 SUPPOSITORY RECTAL EVERY 4 HOURS PRN
Status: DISCONTINUED | OUTPATIENT
Start: 2021-02-09 | End: 2021-02-10 | Stop reason: HOSPADM

## 2021-02-09 RX ORDER — SIMETHICONE 80 MG
80 TABLET,CHEWABLE ORAL 3 TIMES DAILY PRN
Status: DISCONTINUED | OUTPATIENT
Start: 2021-02-09 | End: 2021-02-10 | Stop reason: HOSPADM

## 2021-02-09 RX ORDER — HYDROMORPHONE HYDROCHLORIDE 1 MG/ML
0.4 INJECTION, SOLUTION INTRAMUSCULAR; INTRAVENOUS; SUBCUTANEOUS
Status: DISCONTINUED | OUTPATIENT
Start: 2021-02-09 | End: 2021-02-09 | Stop reason: HOSPADM

## 2021-02-09 RX ORDER — HYDRALAZINE HYDROCHLORIDE 20 MG/ML
5 INJECTION INTRAMUSCULAR; INTRAVENOUS
Status: DISCONTINUED | OUTPATIENT
Start: 2021-02-09 | End: 2021-02-09 | Stop reason: HOSPADM

## 2021-02-09 RX ORDER — DEXAMETHASONE SODIUM PHOSPHATE 4 MG/ML
INJECTION, SOLUTION INTRA-ARTICULAR; INTRALESIONAL; INTRAMUSCULAR; INTRAVENOUS; SOFT TISSUE PRN
Status: DISCONTINUED | OUTPATIENT
Start: 2021-02-09 | End: 2021-02-09 | Stop reason: SURG

## 2021-02-09 RX ORDER — ONDANSETRON 2 MG/ML
4 INJECTION INTRAMUSCULAR; INTRAVENOUS
Status: DISCONTINUED | OUTPATIENT
Start: 2021-02-09 | End: 2021-02-09 | Stop reason: HOSPADM

## 2021-02-09 RX ORDER — SCOLOPAMINE TRANSDERMAL SYSTEM 1 MG/1
1 PATCH, EXTENDED RELEASE TRANSDERMAL
Status: DISCONTINUED | OUTPATIENT
Start: 2021-02-09 | End: 2021-02-10 | Stop reason: HOSPADM

## 2021-02-09 RX ORDER — METOPROLOL TARTRATE 1 MG/ML
1 INJECTION, SOLUTION INTRAVENOUS
Status: DISCONTINUED | OUTPATIENT
Start: 2021-02-09 | End: 2021-02-09 | Stop reason: HOSPADM

## 2021-02-09 RX ORDER — HYDROMORPHONE HYDROCHLORIDE 2 MG/ML
INJECTION, SOLUTION INTRAMUSCULAR; INTRAVENOUS; SUBCUTANEOUS PRN
Status: DISCONTINUED | OUTPATIENT
Start: 2021-02-09 | End: 2021-02-09 | Stop reason: SURG

## 2021-02-09 RX ORDER — ACETAMINOPHEN 10 MG/ML
1000 INJECTION, SOLUTION INTRAVENOUS EVERY 6 HOURS
Status: COMPLETED | OUTPATIENT
Start: 2021-02-09 | End: 2021-02-10

## 2021-02-09 RX ORDER — CEFOTETAN DISODIUM 2 G/20ML
INJECTION, POWDER, FOR SOLUTION INTRAMUSCULAR; INTRAVENOUS PRN
Status: DISCONTINUED | OUTPATIENT
Start: 2021-02-09 | End: 2021-02-09 | Stop reason: SURG

## 2021-02-09 RX ORDER — HALOPERIDOL 5 MG/ML
1 INJECTION INTRAMUSCULAR
Status: DISCONTINUED | OUTPATIENT
Start: 2021-02-09 | End: 2021-02-09 | Stop reason: HOSPADM

## 2021-02-09 RX ORDER — SODIUM CHLORIDE, SODIUM LACTATE, POTASSIUM CHLORIDE, CALCIUM CHLORIDE 600; 310; 30; 20 MG/100ML; MG/100ML; MG/100ML; MG/100ML
INJECTION, SOLUTION INTRAVENOUS CONTINUOUS
Status: DISCONTINUED | OUTPATIENT
Start: 2021-02-09 | End: 2021-02-09 | Stop reason: HOSPADM

## 2021-02-09 RX ORDER — ONDANSETRON 2 MG/ML
4 INJECTION INTRAMUSCULAR; INTRAVENOUS EVERY 4 HOURS PRN
Status: DISCONTINUED | OUTPATIENT
Start: 2021-02-09 | End: 2021-02-10 | Stop reason: HOSPADM

## 2021-02-09 RX ORDER — ONDANSETRON 2 MG/ML
INJECTION INTRAMUSCULAR; INTRAVENOUS PRN
Status: DISCONTINUED | OUTPATIENT
Start: 2021-02-09 | End: 2021-02-09 | Stop reason: SURG

## 2021-02-09 RX ORDER — DIPHENHYDRAMINE HYDROCHLORIDE 50 MG/ML
12.5 INJECTION INTRAMUSCULAR; INTRAVENOUS
Status: DISCONTINUED | OUTPATIENT
Start: 2021-02-09 | End: 2021-02-09 | Stop reason: HOSPADM

## 2021-02-09 RX ORDER — LABETALOL HYDROCHLORIDE 5 MG/ML
10 INJECTION, SOLUTION INTRAVENOUS EVERY 6 HOURS PRN
Status: DISCONTINUED | OUTPATIENT
Start: 2021-02-09 | End: 2021-02-10 | Stop reason: HOSPADM

## 2021-02-09 RX ORDER — MEPERIDINE HYDROCHLORIDE 25 MG/ML
12.5 INJECTION INTRAMUSCULAR; INTRAVENOUS; SUBCUTANEOUS
Status: DISCONTINUED | OUTPATIENT
Start: 2021-02-09 | End: 2021-02-09 | Stop reason: HOSPADM

## 2021-02-09 RX ORDER — SODIUM CHLORIDE, SODIUM LACTATE, POTASSIUM CHLORIDE, CALCIUM CHLORIDE 600; 310; 30; 20 MG/100ML; MG/100ML; MG/100ML; MG/100ML
INJECTION, SOLUTION INTRAVENOUS CONTINUOUS
Status: DISCONTINUED | OUTPATIENT
Start: 2021-02-09 | End: 2021-02-10 | Stop reason: HOSPADM

## 2021-02-09 RX ORDER — DIPHENHYDRAMINE HYDROCHLORIDE 50 MG/ML
12.5 INJECTION INTRAMUSCULAR; INTRAVENOUS EVERY 6 HOURS PRN
Status: DISCONTINUED | OUTPATIENT
Start: 2021-02-09 | End: 2021-02-10 | Stop reason: HOSPADM

## 2021-02-09 RX ORDER — SODIUM CHLORIDE, SODIUM LACTATE, POTASSIUM CHLORIDE, AND CALCIUM CHLORIDE .6; .31; .03; .02 G/100ML; G/100ML; G/100ML; G/100ML
500 INJECTION, SOLUTION INTRAVENOUS
Status: DISCONTINUED | OUTPATIENT
Start: 2021-02-09 | End: 2021-02-10 | Stop reason: HOSPADM

## 2021-02-09 RX ORDER — SODIUM CHLORIDE, SODIUM LACTATE, POTASSIUM CHLORIDE, CALCIUM CHLORIDE 600; 310; 30; 20 MG/100ML; MG/100ML; MG/100ML; MG/100ML
INJECTION, SOLUTION INTRAVENOUS CONTINUOUS
Status: ACTIVE | OUTPATIENT
Start: 2021-02-09 | End: 2021-02-09

## 2021-02-09 RX ORDER — BUPIVACAINE HYDROCHLORIDE AND EPINEPHRINE 5; 5 MG/ML; UG/ML
INJECTION, SOLUTION EPIDURAL; INTRACAUDAL; PERINEURAL
Status: DISCONTINUED | OUTPATIENT
Start: 2021-02-09 | End: 2021-02-09 | Stop reason: HOSPADM

## 2021-02-09 RX ORDER — HYDROMORPHONE HYDROCHLORIDE 1 MG/ML
0.6 INJECTION, SOLUTION INTRAMUSCULAR; INTRAVENOUS; SUBCUTANEOUS
Status: DISCONTINUED | OUTPATIENT
Start: 2021-02-09 | End: 2021-02-09 | Stop reason: HOSPADM

## 2021-02-09 RX ORDER — HYDROMORPHONE HYDROCHLORIDE 1 MG/ML
0.2 INJECTION, SOLUTION INTRAMUSCULAR; INTRAVENOUS; SUBCUTANEOUS
Status: DISCONTINUED | OUTPATIENT
Start: 2021-02-09 | End: 2021-02-09 | Stop reason: HOSPADM

## 2021-02-09 RX ADMIN — PROPOFOL 200 MG: 10 INJECTION, EMULSION INTRAVENOUS at 15:02

## 2021-02-09 RX ADMIN — LIDOCAINE HYDROCHLORIDE 0.5 ML: 10 INJECTION, SOLUTION EPIDURAL; INFILTRATION; INTRACAUDAL at 13:49

## 2021-02-09 RX ADMIN — ONDANSETRON 4 MG: 2 INJECTION INTRAMUSCULAR; INTRAVENOUS at 16:26

## 2021-02-09 RX ADMIN — FAMOTIDINE 20 MG: 10 INJECTION INTRAVENOUS at 20:54

## 2021-02-09 RX ADMIN — ACETAMINOPHEN 1 G: 10 INJECTION, SOLUTION INTRAVENOUS at 13:59

## 2021-02-09 RX ADMIN — SODIUM CHLORIDE, POTASSIUM CHLORIDE, SODIUM LACTATE AND CALCIUM CHLORIDE: 600; 310; 30; 20 INJECTION, SOLUTION INTRAVENOUS at 20:01

## 2021-02-09 RX ADMIN — ACETAMINOPHEN 1000 MG: 10 INJECTION, SOLUTION INTRAVENOUS at 20:55

## 2021-02-09 RX ADMIN — CEFOTETAN DISODIUM 2 G: 2 INJECTION, POWDER, FOR SOLUTION INTRAMUSCULAR; INTRAVENOUS at 15:01

## 2021-02-09 RX ADMIN — MORPHINE SULFATE 5 MG: 10 INJECTION INTRAVENOUS at 21:08

## 2021-02-09 RX ADMIN — POVIDONE IODINE 15 ML: 100 SOLUTION TOPICAL at 13:17

## 2021-02-09 RX ADMIN — FENTANYL CITRATE 50 MCG: 50 INJECTION, SOLUTION INTRAMUSCULAR; INTRAVENOUS at 18:00

## 2021-02-09 RX ADMIN — HYDROMORPHONE HYDROCHLORIDE 1 MG: 2 INJECTION, SOLUTION INTRAMUSCULAR; INTRAVENOUS; SUBCUTANEOUS at 16:29

## 2021-02-09 RX ADMIN — FENTANYL CITRATE 50 MCG: 50 INJECTION, SOLUTION INTRAMUSCULAR; INTRAVENOUS at 15:28

## 2021-02-09 RX ADMIN — MIDAZOLAM HYDROCHLORIDE 2 MG: 1 INJECTION, SOLUTION INTRAMUSCULAR; INTRAVENOUS at 15:01

## 2021-02-09 RX ADMIN — SCOPALAMINE 1 PATCH: 1 PATCH, EXTENDED RELEASE TRANSDERMAL at 13:18

## 2021-02-09 RX ADMIN — SODIUM CHLORIDE, POTASSIUM CHLORIDE, SODIUM LACTATE AND CALCIUM CHLORIDE: 600; 310; 30; 20 INJECTION, SOLUTION INTRAVENOUS at 13:50

## 2021-02-09 RX ADMIN — METHOCARBAMOL 1000 MG: 100 INJECTION INTRAMUSCULAR; INTRAVENOUS at 17:58

## 2021-02-09 RX ADMIN — FENTANYL CITRATE 100 MCG: 50 INJECTION, SOLUTION INTRAMUSCULAR; INTRAVENOUS at 15:02

## 2021-02-09 RX ADMIN — KETOROLAC TROMETHAMINE 30 MG: 30 INJECTION, SOLUTION INTRAMUSCULAR at 22:58

## 2021-02-09 RX ADMIN — DEXAMETHASONE SODIUM PHOSPHATE 4 MG: 4 INJECTION, SOLUTION INTRA-ARTICULAR; INTRALESIONAL; INTRAMUSCULAR; INTRAVENOUS; SOFT TISSUE at 16:26

## 2021-02-09 RX ADMIN — ROCURONIUM BROMIDE 50 MG: 10 INJECTION, SOLUTION INTRAVENOUS at 15:02

## 2021-02-09 RX ADMIN — ONDANSETRON 4 MG: 2 INJECTION INTRAMUSCULAR; INTRAVENOUS at 20:01

## 2021-02-09 ASSESSMENT — COGNITIVE AND FUNCTIONAL STATUS - GENERAL
SUGGESTED CMS G CODE MODIFIER DAILY ACTIVITY: CH
DAILY ACTIVITIY SCORE: 24
SUGGESTED CMS G CODE MODIFIER MOBILITY: CH
MOBILITY SCORE: 24

## 2021-02-09 ASSESSMENT — LIFESTYLE VARIABLES
AVERAGE NUMBER OF DAYS PER WEEK YOU HAVE A DRINK CONTAINING ALCOHOL: 0
EVER HAD A DRINK FIRST THING IN THE MORNING TO STEADY YOUR NERVES TO GET RID OF A HANGOVER: NO
TOTAL SCORE: 0
HOW MANY TIMES IN THE PAST YEAR HAVE YOU HAD 5 OR MORE DRINKS IN A DAY: 0
DOES PATIENT WANT TO STOP DRINKING: NO
EVER FELT BAD OR GUILTY ABOUT YOUR DRINKING: NO
CONSUMPTION TOTAL: NEGATIVE
HAVE YOU EVER FELT YOU SHOULD CUT DOWN ON YOUR DRINKING: NO
HAVE PEOPLE ANNOYED YOU BY CRITICIZING YOUR DRINKING: NO
TOTAL SCORE: 0
TOTAL SCORE: 0
ON A TYPICAL DAY WHEN YOU DRINK ALCOHOL HOW MANY DRINKS DO YOU HAVE: 0
ALCOHOL_USE: NO

## 2021-02-09 ASSESSMENT — PAIN DESCRIPTION - PAIN TYPE
TYPE: SURGICAL PAIN
TYPE: ACUTE PAIN
TYPE: ACUTE PAIN;SURGICAL PAIN
TYPE: SURGICAL PAIN
TYPE: SURGICAL PAIN

## 2021-02-09 ASSESSMENT — PATIENT HEALTH QUESTIONNAIRE - PHQ9
SUM OF ALL RESPONSES TO PHQ9 QUESTIONS 1 AND 2: 0
2. FEELING DOWN, DEPRESSED, IRRITABLE, OR HOPELESS: NOT AT ALL
1. LITTLE INTEREST OR PLEASURE IN DOING THINGS: NOT AT ALL

## 2021-02-09 ASSESSMENT — FIBROSIS 4 INDEX: FIB4 SCORE: 1.02

## 2021-02-09 ASSESSMENT — PAIN SCALES - GENERAL: PAIN_LEVEL: 2

## 2021-02-09 NOTE — ANESTHESIA PROCEDURE NOTES
Airway    Date/Time: 2/9/2021 3:07 PM  Performed by: Sky Sutton M.D.  Authorized by: Sky Sutton M.D.     Location:  OR  Urgency:  Elective  Indications for Airway Management:  Anesthesia      Spontaneous Ventilation: absent    Sedation Level:  Deep  Preoxygenated: Yes    Patient Position:  Sniffing  Final Airway Type:  Endotracheal airway  Final Endotracheal Airway:  ETT  Cuffed: Yes    Technique Used for Successful ETT Placement:  Direct laryngoscopy    Insertion Site:  Oral  Blade Type:  Edgardo  Laryngoscope Blade/Videolaryngoscope Blade Size:  3  ETT Size (mm):  7.0  Measured from:  Teeth  ETT to Teeth (cm):  23  Placement Verified by: auscultation and capnometry    Cormack-Lehane Classification:  Grade I - full view of glottis  Number of Attempts at Approach:  1

## 2021-02-09 NOTE — ANESTHESIA PREPROCEDURE EVALUATION
Relevant Problems   PULMONARY   (+) Asthma complicating pregnancy, antepartum       Physical Exam    Airway   Mallampati: II  TM distance: >3 FB  Neck ROM: full       Cardiovascular - normal exam  Rhythm: regular  Rate: normal  (-) murmur     Dental - normal exam           Pulmonary - normal exam  Breath sounds clear to auscultation     Abdominal    Neurological - normal exam                 Anesthesia Plan    ASA 3   ASA physical status 3 criteria: morbid obesity - BMI greater than or equal to 40    Plan - general       Airway plan will be ETT        Induction: intravenous    Postoperative Plan: Postoperative administration of opioids is intended.    Pertinent diagnostic labs and testing reviewed    Informed Consent:    Anesthetic plan and risks discussed with patient.    Use of blood products discussed with: patient whom consented to blood products.

## 2021-02-10 VITALS
RESPIRATION RATE: 17 BRPM | HEIGHT: 66 IN | HEART RATE: 74 BPM | OXYGEN SATURATION: 92 % | WEIGHT: 260.58 LBS | DIASTOLIC BLOOD PRESSURE: 97 MMHG | SYSTOLIC BLOOD PRESSURE: 132 MMHG | TEMPERATURE: 97.8 F | BODY MASS INDEX: 41.88 KG/M2

## 2021-02-10 LAB
ANION GAP SERPL CALC-SCNC: 10 MMOL/L (ref 7–16)
BUN SERPL-MCNC: 8 MG/DL (ref 8–22)
CALCIUM SERPL-MCNC: 8.6 MG/DL (ref 8.5–10.5)
CHLORIDE SERPL-SCNC: 101 MMOL/L (ref 96–112)
CO2 SERPL-SCNC: 24 MMOL/L (ref 20–33)
CREAT SERPL-MCNC: 0.63 MG/DL (ref 0.5–1.4)
ERYTHROCYTE [DISTWIDTH] IN BLOOD BY AUTOMATED COUNT: 43.3 FL (ref 35.9–50)
GLUCOSE SERPL-MCNC: 154 MG/DL (ref 65–99)
HCT VFR BLD AUTO: 38.3 % (ref 37–47)
HGB BLD-MCNC: 12.6 G/DL (ref 12–16)
MCH RBC QN AUTO: 29.6 PG (ref 27–33)
MCHC RBC AUTO-ENTMCNC: 32.9 G/DL (ref 33.6–35)
MCV RBC AUTO: 90.1 FL (ref 81.4–97.8)
PATHOLOGY CONSULT NOTE: NORMAL
PLATELET # BLD AUTO: 220 K/UL (ref 164–446)
PMV BLD AUTO: 10.7 FL (ref 9–12.9)
POTASSIUM SERPL-SCNC: 4.1 MMOL/L (ref 3.6–5.5)
RBC # BLD AUTO: 4.25 M/UL (ref 4.2–5.4)
SODIUM SERPL-SCNC: 135 MMOL/L (ref 135–145)
WBC # BLD AUTO: 9.1 K/UL (ref 4.8–10.8)

## 2021-02-10 PROCEDURE — 700105 HCHG RX REV CODE 258: Performed by: PHYSICIAN ASSISTANT

## 2021-02-10 PROCEDURE — 85027 COMPLETE CBC AUTOMATED: CPT

## 2021-02-10 PROCEDURE — 80048 BASIC METABOLIC PNL TOTAL CA: CPT

## 2021-02-10 PROCEDURE — 700111 HCHG RX REV CODE 636 W/ 250 OVERRIDE (IP): Performed by: PHYSICIAN ASSISTANT

## 2021-02-10 RX ADMIN — SODIUM CHLORIDE, POTASSIUM CHLORIDE, SODIUM LACTATE AND CALCIUM CHLORIDE: 600; 310; 30; 20 INJECTION, SOLUTION INTRAVENOUS at 01:00

## 2021-02-10 RX ADMIN — ACETAMINOPHEN 1000 MG: 10 INJECTION, SOLUTION INTRAVENOUS at 02:20

## 2021-02-10 RX ADMIN — FAMOTIDINE 20 MG: 10 INJECTION INTRAVENOUS at 06:09

## 2021-02-10 RX ADMIN — KETOROLAC TROMETHAMINE 30 MG: 30 INJECTION, SOLUTION INTRAMUSCULAR at 06:08

## 2021-02-10 RX ADMIN — ENOXAPARIN SODIUM 40 MG: 40 INJECTION SUBCUTANEOUS at 09:52

## 2021-02-10 RX ADMIN — KETOROLAC TROMETHAMINE 30 MG: 30 INJECTION, SOLUTION INTRAMUSCULAR at 11:35

## 2021-02-10 ASSESSMENT — ENCOUNTER SYMPTOMS
NAUSEA: 1
ABDOMINAL PAIN: 1
CHILLS: 0
FEVER: 0
ROS GI COMMENTS: DYSPHAGIA
SHORTNESS OF BREATH: 0
VOMITING: 0

## 2021-02-10 ASSESSMENT — PAIN DESCRIPTION - PAIN TYPE: TYPE: ACUTE PAIN;SURGICAL PAIN

## 2021-02-10 NOTE — OP REPORT
Operative Report    Date: 2/9/2021    Surgeon: John Ganser M.D.    Assistant: Dudley Hess PA-C    Anesthesia: Dr. Sutton    Preoperative Diagnosis: Morbid Obesity. GERD, sleep apnea, prior Lap Band    Postoperative Diagnosis: Same, Hiatal Hernia    Procedure Performed: Laparoscopic Vertical Sleeve Gastrectomy, Hiatal hernia repair    Indications: The patient is suffering from morbid obesity and it's sequelae with a body mass index of 42 kg/m2. They have failed dietary attempts at weight loss and have been fully counseled about risks and alternatives to sleeve gastrectomy and wish to proceed.    Findings: Sliding hiatal hernia    DESCRIPTION OF PROCEDURE: The patient was identified and general anesthetic   administered. Her abdomen was prepped and draped in the usual sterile   fashion. Local anesthesia of 0.5% Marcaine with epinephrine was injected   prior to making skin incision. Small incision was made to left midline in low  epigastric region and the Veress needle passed. The abdomen was insufflated   with carbon dioxide without incident and a 5-mm blunt trocar and 5-mm   30-degree scope inserted. Redd liver retractor was passed through a   small subxiphoid incision, used to elevate the lateral segment of liver and   this was held with the robotic arm. Right upper quadrant 12 mm, left upper   quadrant 15 mm, left lateral subcostal 5 mm trocars were placed. Inspection   of the hiatus revealed a sliding hiatal hernia. The fat pad was rotated off the gastroesophageal junction to help expose this area as well. Scarring and gastropexy sutures from the previous Lap Band were divided with cautery. The scar capsule from the band was removed anteriorly. The omentum was then taken down off the greater curve of the stomach using the Harmonic scalpel.    The hiatal hernia was reduced by circumferentially dissecting the hiatus bringing the gastroesophageal junction well below the hiatus. The hiatal hernia repaired  posteriorly with 0-Surgidac horizontal mattress sutures.    A 42-Micronesian bougie wasthen passed by anesthesia and laid along the lesser curve of the stomach. The [place] 60 power stapler was then placed starting about 4-5 cm proximal to the pylorus and positioned adjacent to the bougie and fired. The sleeve was   completed with sequential firing of the stapler using green and gold loads. A  small cuff of stomach was left adjacent to the esophagus. The distal stomach was removed through the 15 mm trocar site.    The staple line was then oversewn with 3-0 absorbable V-Loc suture   incorporating the omentum along the way. The bougie was removed and the   sleeve maintained good orientation with no torsion or kinks. The abdomen was   irrigated and hemostasis assured. The trocars were removed. The abdomen   deflated, and incisions closed with Vicryl. Sterile dressings were applied.   The patient returned to recovery room in stable condition.    ____________________________________  JOHN H. GANSER, MD John Ganser, M.D., F.A.C.S.  [facility]  [facility] [place]  [phone]

## 2021-02-10 NOTE — PROGRESS NOTES
Surgical Progress Note    Author: ZARIA Cary Date & Time created: 2/10/2021   10:04 AM     Interval Events:  S/p Laparoscopic Vertical Sleeve Gastrectomy, hiatal hernia repair -POD#1, doing well; marce bariatric clears  well without dysphagia or N/V; ambulatory; using IS; pain controlled; wants to go home    Review of Systems   Constitutional: Negative for chills and fever.   Respiratory: Negative for shortness of breath.    Gastrointestinal: Positive for abdominal pain and nausea. Negative for vomiting.        Dysphagia   Skin: Negative for itching and rash.     Hemodynamics:  Temp (24hrs), Av.3 °C (97.3 °F), Min:35.6 °C (96 °F), Max:37 °C (98.6 °F)  Temperature: 36.6 °C (97.8 °F)  Pulse  Av.2  Min: 63  Max: 92   Blood Pressure: 132/97     Respiratory:    Respiration: 17, Pulse Oximetry: 92 %           Neuro:  GCS       Fluids:    Intake/Output Summary (Last 24 hours) at 2/10/2021 1004  Last data filed at 2/10/2021 0830  Gross per 24 hour   Intake 3417.5 ml   Output 15 ml   Net 3402.5 ml     Weight: 118 kg (260 lb 9.3 oz)  Current Diet Order   Procedures   • Diet Order Diet: Clear Liquid; Miscellaneous modifications: (optional): Bariatric     Physical Exam  Vitals and nursing note reviewed.   Constitutional:       General: She is not in acute distress.     Appearance: She is obese.   Cardiovascular:      Rate and Rhythm: Normal rate.   Pulmonary:      Effort: Pulmonary effort is normal. No respiratory distress.   Abdominal:      Palpations: Abdomen is soft.      Comments: Teg's c/d/i   Skin:     General: Skin is warm and dry.   Neurological:      Mental Status: She is alert and oriented to person, place, and time.   Psychiatric:         Mood and Affect: Mood normal.       Labs:  Recent Results (from the past 24 hour(s))   HCG Qualitative Ur    Collection Time: 21 12:19 PM   Result Value Ref Range    Beta-Hcg Urine Negative Negative   COMP METABOLIC PANEL    Collection Time: 21  1:45 PM    Result Value Ref Range    Sodium 138 135 - 145 mmol/L    Potassium 3.8 3.6 - 5.5 mmol/L    Chloride 102 96 - 112 mmol/L    Co2 21 20 - 33 mmol/L    Anion Gap 15.0 7.0 - 16.0    Glucose 93 65 - 99 mg/dL    Bun 9 8 - 22 mg/dL    Creatinine 0.66 0.50 - 1.40 mg/dL    Calcium 9.6 8.5 - 10.5 mg/dL    AST(SGOT) 53 (H) 12 - 45 U/L    ALT(SGPT) 79 (H) 2 - 50 U/L    Alkaline Phosphatase 84 30 - 99 U/L    Total Bilirubin 0.5 0.1 - 1.5 mg/dL    Albumin 4.2 3.2 - 4.9 g/dL    Total Protein 7.8 6.0 - 8.2 g/dL    Globulin 3.6 (H) 1.9 - 3.5 g/dL    A-G Ratio 1.2 g/dL   ESTIMATED GFR    Collection Time: 02/09/21  1:45 PM   Result Value Ref Range    GFR If African American >60 >60 mL/min/1.73 m 2    GFR If Non African American >60 >60 mL/min/1.73 m 2   CBC without Differential (blood)    Collection Time: 02/10/21  4:59 AM   Result Value Ref Range    WBC 9.1 4.8 - 10.8 K/uL    RBC 4.25 4.20 - 5.40 M/uL    Hemoglobin 12.6 12.0 - 16.0 g/dL    Hematocrit 38.3 37.0 - 47.0 %    MCV 90.1 81.4 - 97.8 fL    MCH 29.6 27.0 - 33.0 pg    MCHC 32.9 (L) 33.6 - 35.0 g/dL    RDW 43.3 35.9 - 50.0 fL    Platelet Count 220 164 - 446 K/uL    MPV 10.7 9.0 - 12.9 fL   Basic Metabolic Panel (BMP)    Collection Time: 02/10/21  5:00 AM   Result Value Ref Range    Sodium 135 135 - 145 mmol/L    Potassium 4.1 3.6 - 5.5 mmol/L    Chloride 101 96 - 112 mmol/L    Co2 24 20 - 33 mmol/L    Glucose 154 (H) 65 - 99 mg/dL    Bun 8 8 - 22 mg/dL    Creatinine 0.63 0.50 - 1.40 mg/dL    Calcium 8.6 8.5 - 10.5 mg/dL    Anion Gap 10.0 7.0 - 16.0   ESTIMATED GFR    Collection Time: 02/10/21  5:00 AM   Result Value Ref Range    GFR If African American >60 >60 mL/min/1.73 m 2    GFR If Non African American >60 >60 mL/min/1.73 m 2   Histology Request    Collection Time: 02/10/21  8:43 AM   Result Value Ref Range    Pathology Request Sent to Histo      Medical Decision Making, by Problem:  There are no active hospital problems to display for this patient.    Plan:  Bariatric  clears today, advance diet as tolerated to Bariatric full liquids on morning of POD#2. Strive for 5-10 ouces of fluid per hour while awake. Follow guidelines in preop booklet for food choices.  Incentive spirometry Q hr while awake, continue at home.  Up ad kareen.  Ok to Shower over Tegaderms; remove Tegaderms on 02/13/21 .  No baths or soaks x 14 days.  No driving x 4-5 days.  No lifting > 15 lbs until after post-op appt.  D/C home when alert, comfortable, ambulatory, and tolerating PO well.  Pt Counseled re: I.S., diet, activity, home med's, continued use of incentive spirometer at home,  and wound care.  Begin PPI at home (all home med's larger in size than eraser head should be crushed or changed to liquid form x 1-2 weeks, while on liquid diet).  Hold MVI/supplements until after postop appointment.  F/U with Dr. Ganser ~ 1-2 weeks.    Quality Measures:  Quality-Core Measures   Reviewed items::  Labs reviewed and Medications reviewed  Patel catheter::  No Patel  DVT prophylaxis pharmacological::  Enoxaparin (Lovenox)  DVT prophylaxis - mechanical:  SCDs  Ulcer Prophylaxis::  Yes      Discussed patient condition with RN, Patient and Dr. Ganser

## 2021-02-10 NOTE — DIETARY
NUTRITION SERVICES: BMI - Pt with BMI >40 (=Body mass index is 42.06 kg/m².), morbid obesity. Weight loss counseling not appropriate in acute care setting. Pt s/p lap vertical sleeve gastrectomy, anticipate f/u planned with MD/RD post d/c.   RECOMMEND - Referral to outpatient nutrition services for weight management, or F/u with MD/RD after D/C.

## 2021-02-10 NOTE — DISCHARGE INSTRUCTIONS
DISCHARGE INSTRUCTIONS FROM DR. GANSER:  Bariatric clears today, advance diet as tolerated to Bariatric full liquids on morning of January 11, 2021. Strive for 5-10 ouces of fluid per hour while awake. Follow guidelines in preop booklet for food choices.  Incentive spirometry every hour while awake, continue at home.  Up as tolerated.  Ok to Shower over Tegaderms; remove Tegaderms on 02/13/21 .  No baths or soaks x 14 days.  No driving x 4-5 days.  No lifting greater than 15 lbs until after post-op appt.  Patient counseled re: Incentive Spirometry, diet, activity, home medications, continued use of incentive spirometer at home,  and wound care.  Begin PPI at home (all home medications larger in size than eraser head should be crushed or changed to liquid form x 1-2 weeks, while on liquid diet).  Hold Multivitamins and supplements until after postop appointment.  Follow up with Dr. Ganser in approximately 1-2 weeks.    Discharge Instructions    Discharged to home by car with friend. Discharged via wheelchair, hospital escort: Yes.  Special equipment needed: Not Applicable    Be sure to schedule a follow-up appointment with your primary care doctor or any specialists as instructed.     Discharge Plan: Follow up with Dr. Ganser.  Take your medications as prescribed.  Call Dr. Ganser's office with any questions.  Diet Plan: Discussed  Activity Level: Discussed  Confirmed Follow up Appointment: Patient to Call and Schedule Appointment  Confirmed Symptoms Management: Discussed  Medication Reconciliation Updated: Yes  Influenza Vaccine Indication: Not indicated: Previously immunized this influenza season and > 8 years of age    I understand that a diet low in cholesterol, fat, and sodium is recommended for good health. Unless I have been given specific instructions below for another diet, I accept this instruction as my diet prescription.     Other diet:   Eating Plan After Bariatric Surgery, Stage 1  A diet after weight  loss surgery (bariatric surgery) should provide plenty of fluids and nutrients while promoting weight loss and healing. Each stage of recovery has a different set of food and drink recommendations. Your health care provider may recommend that you work with a diet and nutrition specialist (dietitian) to make a staged eating plan that is right for you.  Stage 1 begins right after surgery and lasts until about 2 weeks after surgery, or as long as directed by your health care provider. During this time, you will eat a liquid-only diet. You will be on clear liquids immediately after surgery. After your health care provider approves, you will move on to full liquids.  What are tips for following this plan?    Meal planning  · Eat at set times. Allow 30-45 minutes for each meal.  · Have protein with every meal. Eat protein foods first. Protein is a very important nutrient after surgery.  · You will need at least 60-80 g of protein daily, or as much as determined by your dietitian. Work with your dietitian to choose a liquid protein supplement that has:  ? At least 15 g of protein per 8 oz serving.  ? Less than 20 g total carbohydrate per 8 oz serving.  ? Less than 5 g fat per 8 oz serving.  · To get more protein, you may add 1 Tbsp non-fat dry milk powder to each ¼ cup of skim milk.  · Do not eat or drink:  ? Carbonated beverages.  ? Sweets with more than 25 g of sugar per serving.  ? High-fat foods. This includes foods with more than 5 g of fat per serving.  ? Alcohol.  ? Any foods you do not tolerate well, such as dairy or high-fiber foods.  · Move on to a soft-food diet when your health care provider approves. For most people, this happens after 2 weeks on a liquid-only diet.  General instructions  · Sip liquids. Do not use a straw until several weeks after surgery.  · Do not drink extra liquids with meals for 30 minutes before or after meals.  · Slowly sip 8-10 oz of clear liquid, preferably water, between each meal.  "Try to get at least 48-60 oz of fluid each day.  · Take a liquid or chewable multivitamin with iron each day. Discuss additional supplements with your health care provider or dietitian.  · Stop eating when you feel full.  · Your surgeon or dietitian may have specific eating guidelines for you. This may depend on:  ? The type of weight loss surgery you had.  ? Your overall health.  Clear liquids  It is important to drink clear liquids to make sure you stay hydrated. Try to drink at least 24-30 oz (about 3 cups) of clear liquids each day. Clear liquids have:  · No carbonation.  · No sugar or calories.  · No caffeine or alcohol.  Clear liquids include:  · Water and flavored water.  · Decaffeinated coffee or tea.  · \"Light\" powdered juice mixes, like lemonade.  · Clear broth.  · \"Light\" flavored gelatin.  · Sugar-free popsicles.  Full liquids  Full liquids are important for making sure you have enough calories, protein, and other nutrients. Try to get at least 24-30 oz (about 3 cups) of full liquids each day. Full liquids include:  · Low-fat or fat-free milk.  · Soy milk.  · Yogurt.  · Protein shakes or protein powder.  · \"Light\" meal replacement shakes.  · Sugar-free pudding.  Summary  · Stage 1 begins right after surgery and lasts until about 2 weeks after surgery, or as directed by your health care provider.  · Stage 1 diet is a liquid-only diet. You will be on clear liquids immediately after surgery. After your health care provider approves, you will move to full liquids.  · Eating enough protein and drinking plenty of fluids are important in promoting weight loss and healing after surgery.  This information is not intended to replace advice given to you by your health care provider. Make sure you discuss any questions you have with your health care provider.    Special Instructions:   Endoscopic Sleeve Gastroplasty, Care After  This sheet gives you information about how to care for yourself after your procedure. " Your health care provider may also give you more specific instructions. If you have problems or questions, contact your health care provider.  What can I expect after the procedure?  After the procedure, it is common to:  · Have a decreased appetite.  · Feel full after eating less than you used to eat.  · Have mild swelling or pressure in your abdomen (bloating).  · Have nausea.  Follow these instructions at home:  Medicines  · Take over-the-counter and prescription medicines only as told by your health care provider. You may be given:  ? Pain medicine.  ? Anti-nausea medicine.  ? Medicine to prevent heartburn.  Eating and drinking  · Follow instructions from your health care provider about eating or drinking restrictions.  ? For a few weeks, your diet may be limited to only protein shakes.  ? After a few weeks, you may begin to eat a soft-food diet.  ? Eventually, you will move to a regular diet.  · Drink enough fluid to keep your urine pale yellow. Ask your health care provider about the best way to plan your water drinking so you can avoid feeling full during meal time.  · When you return to a regular diet, you will need to make sure you are getting the right amount of nutrients and calories. Work with your dietitian to make sure your diet gives you enough energy but does not cause you to gain weight.  · Ask your health care provider if you should take a vitamin or mineral supplement.  Activity    · Follow your exercise plan as told by your health care provider. Exercise is an important part of losing weight and staying at (maintaining) a healthy weight after surgery.  · Return to your normal activities as told by your health care provider. Ask your health care provider what activities and exercises are safe for you.  General instructions  · Do not drive or use heavy machinery while taking prescription pain medicine.  · Do not drive for 24 hours if you were given a medicine to help you relax (sedative) during  your procedure.  · Keep all follow-up visits as told by your health care provider. This is important. You will need to return for imaging tests of your stomach a few weeks after surgery.  Contact a health care provider if:  · You have chills or a fever.  · You have pain or nausea that does not go away or get better with medicine.  · You have frequent vomiting.  · You have heartburn.  · You have difficulty swallowing.  Get help right away if:  · You have severe pain.  · You are unable to swallow.  · You have severe vomiting.  · You vomit blood.  · You have chest pain.  · You have difficulty breathing.  Summary  · Take over-the-counter and prescription medicines only as told by your health care provider.  · Do not drive or use heavy machinery while taking prescription pain medicine.  · Follow instructions from your health care provider about eating or drinking restrictions.  · Ask your health care provider what activities and exercises are safe for you.  This information is not intended to replace advice given to you by your health care provider. Make sure you discuss any questions you have with your health care provider.      Hernia Repair  Care After  These instructions give you information on caring for yourself after your procedure. Your doctor may also give you more specific instructions. Call your doctor if you have any problems or questions after your procedure.  HOME CARE   · You may have changes in your poops (bowel movements).  · You may have loose or watery poop (diarrhea).  · You may be not able to poop.  · Your bowels will slowly get back to normal.  · Do not eat any food that makes you sick to your stomach (nauseous). Eat small meals 4 to 6 times a day instead of 3 large ones.  · Do not drink pop. It will give you gas.  · Do not drink alcohol.  · Do not lift anything heavier than 10 pounds. This is about the weight of a gallon of milk.  · Do not do anything that makes you very tired for at least 6  weeks.  · Do not get your wound wet for 2 days.  · You may take a sponge bath during this time.  · After 2 days you may take a shower. Gently pat your surgical cut (incision) dry with a towel. Do not rub it.  · For men: You may have been given an athletic supporter (scrotal support) before you left the hospital. It holds your scrotum and testicles closer to your body so there is no strain on your wound. Wear the supporter until your doctor tells you that you do not need it anymore.  GET HELP RIGHT AWAY IF:  · You have watery poop, or cannot poop for more than 3 days.  · You feel sick to your stomach or throw up (vomit) more than 2 or 3 times.  · You have temperature by mouth above 102° F (38.9° C).  · You see redness or puffiness (swelling) around your wound.  · You see yellowish white fluid (pus) coming from your wound.  · You see a bulge or bump in your lower belly (abdomen) or near your groin.  · You develop a rash, trouble breathing, or any other symptoms from medicines taken.  MAKE SURE YOU:  · Understand these instructions.  · Will watch your condition.  · Will get help right away if your are not doing well or get worse.    · Is patient discharged on Warfarin / Coumadin?   No     Depression / Suicide Risk    As you are discharged from this [facility] facility, it is important to learn how to keep safe from harming yourself.    Recognize the warning signs:  · Abrupt changes in personality, positive or negative- including increase in energy   · Giving away possessions  · Change in eating patterns- significant weight changes-  positive or negative  · Change in sleeping patterns- unable to sleep or sleeping all the time   · Unwillingness or inability to communicate  · Depression  · Unusual sadness, discouragement and loneliness  · Talk of wanting to die  · Neglect of personal appearance   · Rebelliousness- reckless behavior  · Withdrawal from people/activities they love  · Confusion- inability to  concentrate     If you or a loved one observes any of these behaviors or has concerns about self-harm, here's what you can do:  · Talk about it- your feelings and reasons for harming yourself  · Remove any means that you might use to hurt yourself (examples: pills, rope, extension cords, firearm)  · Get professional help from the community (Mental Health, Substance Abuse, psychological counseling)  · Do not be alone:Call your Safe Contact- someone whom you trust who will be there for you.  · Call your local CRISIS HOTLINE 779-2720 or 738-021-5151  · Call your local Children's Mobile Crisis Response Team [place] (750) 906-9079 or www.[employer]  · Call the toll free National Suicide Prevention Hotlines   · National Suicide Prevention Lifeline 730-729-LVCL (1232)  · National Hope Line Network 800-SUICIDE (391-5280)

## 2021-02-10 NOTE — PROGRESS NOTES
Pt arrived from PACU at 1950 in NAD.     Pt AO x 4  Vitals signs stable  Pt denies chest pain or SOB  O2 sat >90% on 10L oxymask (ERAS until 2230) breathing unlaobred   Pt endorses 7/10 Abd pain, PRN's given per orders.   Pt endorses mild nausea, zofran given.  + voiding, - flatus, +bowel sounds   Pt ambulates with standby assistance   SCDs on     Updated plan of care discussed with pt. Safety education done. Falls precautions in place.   Pt safety maintained. Hourly rounding done.

## 2021-02-10 NOTE — OR NURSING
Patient complained of pain; medicated as ordered.  Patient declined the Dilaudid for complaint of pain; unable to give PO meds  Patient remains NPO  Patient on ERAS O2 @ 10 l/m via oxymask  Drowsy; arouses easily and returns to sleep quickly  Transferred to room via [facility]  O2 tank full

## 2021-02-10 NOTE — PROGRESS NOTES
Discharge orders received.  Patient arrived to the discharge lounge.  PIV discontinued.  Instructions, prescriptions and education given to patient.  Follow up appointments discussed.  Patient verbalized understanding of dc instructions and prescriptions.  Patient signed discharge instructions.  Patient verbalized she had all belongings with her.  Patient waiting for her ride.

## 2021-02-10 NOTE — ANESTHESIA POSTPROCEDURE EVALUATION
Patient: Kayla Barrera    Procedure Summary     Date: 02/09/21 Room / Location: [place] [address] / SURGERY [facility]    Anesthesia Start: 1501 Anesthesia Stop:     Procedures:       GASTRECTOMY, SLEEVE, LAPAROSCOPIC (N/A Abdomen)      REPAIR, HERNIA, HIATAL, LAPAROSCOPIC (N/A Abdomen) Diagnosis: (MORBID OBESITY)    Surgeons: John H Ganser, M.D. Responsible Provider: Sky Sutton M.D.    Anesthesia Type: general ASA Status: 3          Final Anesthesia Type: general  Last vitals  BP   Blood Pressure: 140/91    Temp   (!) 35.6 °C (96 °F)    Pulse   Pulse: 77   Resp   18    SpO2   98 %      Anesthesia Post Evaluation    Patient location during evaluation: PACU  Patient participation: complete - patient participated  Level of consciousness: awake and alert  Pain score: 2    Airway patency: patent  Anesthetic complications: no  Cardiovascular status: hemodynamically stable  Respiratory status: acceptable  Hydration status: euvolemic    PONV: none           Nurse Pain Score: 5 (NPRS)

## 2021-02-10 NOTE — ANESTHESIA TIME REPORT
Anesthesia Start and Stop Event Times     Date Time Event    2/9/2021 1426 Ready for Procedure     1501 Anesthesia Start     1645 Anesthesia Stop        Responsible Staff  02/09/21    Name Role Begin End    Sky Sutton M.D. Anesth 1501 1645        Preop Diagnosis (Free Text):  Pre-op Diagnosis     MORBID OBESITY        Preop Diagnosis (Codes):    Post op Diagnosis  S/P partial gastrectomy      Premium Reason  A. 3PM - 7AM    Comments: Premium JENELLE

## 2021-02-10 NOTE — DISCHARGE PLANNING
Care Transition Team Assessment    LSW met with the patient and Deepjulio Barrera, patient's SO at bedside, Deep's phone number is [phone].    The patient verified the demographic information in the Facesheet.     The patient reported she lives at home with her father. The patient indicated her PCP is [facility] Poon at [facility].    Based on the information provided by this patient, the anticipated discharge disposition is Home.  Information Source  Orientation : Oriented x 4  Information Given By: Patient  Informant's Name: (Kayla)  Who is responsible for making decisions for patient? : Patient    Readmission Evaluation  Is this a readmission?: No    Elopement Risk  Legal Hold: No  Ambulatory or Self Mobile in Wheelchair: Yes  Disoriented: No  Psychiatric Symptoms: None  History of Wandering: No  Elopement this Admit: No  Vocalizing Wanting to Leave: No  Displays Behaviors, Body Language Wanting to Leave: No-Not at Risk for Elopement    Interdisciplinary Discharge Planning  Patient or legal guardian wants to designate a caregiver: No    Discharge Preparedness  What is your plan after discharge?: Home with help  What are your discharge supports?: Parent, Spouse  Prior Functional Level: Independent with Activities of Daily Living    Functional Assesment  Prior Functional Level: Independent with Activities of Daily Living    Finances  Financial Barriers to Discharge: No  Prescription Coverage: Yes    Vision / Hearing Impairment  Vision Impairment : No  Hearing Impairment : No         Advance Directive  Advance Directive?: None  Advance Directive offered?: AD Booklet refused    Domestic Abuse  Have you ever been the victim of abuse or violence?: No  Physical Abuse or Sexual Abuse: No  Verbal Abuse or Emotional Abuse: No  Possible Abuse/Neglect Reported to:: Not Applicable    Psychological Assessment  History of Substance Abuse: None  History of Psychiatric Problems: No    Discharge Risks or  Barriers  Discharge risks or barriers?: No    Anticipated Discharge Information  Discharge Disposition: Discharged to home/self care (01)

## 2021-02-10 NOTE — OR NURSING
Boyfriend, Rozina, notified of patient's status and room number.  Called Rozina again to inform him that her room had been changed from T406 to T436-1.

## 2021-05-06 ENCOUNTER — HOSPITAL ENCOUNTER (OUTPATIENT)
Dept: LAB | Facility: MEDICAL CENTER | Age: 41
End: 2021-05-06
Attending: FAMILY MEDICINE
Payer: COMMERCIAL

## 2021-05-06 ENCOUNTER — HOSPITAL ENCOUNTER (OUTPATIENT)
Dept: LAB | Facility: MEDICAL CENTER | Age: 41
End: 2021-05-06
Attending: CLINICAL NURSE SPECIALIST
Payer: COMMERCIAL

## 2021-05-06 LAB
ALBUMIN SERPL BCP-MCNC: 3.9 G/DL (ref 3.2–4.9)
ALBUMIN/GLOB SERPL: 1.3 G/DL
ALP SERPL-CCNC: 87 U/L (ref 30–99)
ALT SERPL-CCNC: 22 U/L (ref 2–50)
ANION GAP SERPL CALC-SCNC: 7 MMOL/L (ref 7–16)
AST SERPL-CCNC: 12 U/L (ref 12–45)
BASOPHILS # BLD AUTO: 0.8 % (ref 0–1.8)
BASOPHILS # BLD: 0.04 K/UL (ref 0–0.12)
BILIRUB SERPL-MCNC: 0.6 MG/DL (ref 0.1–1.5)
BUN SERPL-MCNC: 12 MG/DL (ref 8–22)
CALCIUM SERPL-MCNC: 9 MG/DL (ref 8.5–10.5)
CHLORIDE SERPL-SCNC: 109 MMOL/L (ref 96–112)
CHOLEST SERPL-MCNC: 136 MG/DL (ref 100–199)
CO2 SERPL-SCNC: 23 MMOL/L (ref 20–33)
CREAT SERPL-MCNC: 0.73 MG/DL (ref 0.5–1.4)
CRP SERPL HS-MCNC: 0.94 MG/DL (ref 0–0.75)
EOSINOPHIL # BLD AUTO: 0.11 K/UL (ref 0–0.51)
EOSINOPHIL NFR BLD: 2.1 % (ref 0–6.9)
ERYTHROCYTE [DISTWIDTH] IN BLOOD BY AUTOMATED COUNT: 42.7 FL (ref 35.9–50)
ERYTHROCYTE [SEDIMENTATION RATE] IN BLOOD BY WESTERGREN METHOD: 14 MM/HOUR (ref 0–25)
EST. AVERAGE GLUCOSE BLD GHB EST-MCNC: 117 MG/DL
FASTING STATUS PATIENT QL REPORTED: NORMAL
FOLATE SERPL-MCNC: 6.9 NG/ML
GLOBULIN SER CALC-MCNC: 3 G/DL (ref 1.9–3.5)
GLUCOSE SERPL-MCNC: 103 MG/DL (ref 65–99)
HBA1C MFR BLD: 5.7 % (ref 4–5.6)
HCT VFR BLD AUTO: 42.6 % (ref 37–47)
HDLC SERPL-MCNC: 41 MG/DL
HGB BLD-MCNC: 14 G/DL (ref 12–16)
IMM GRANULOCYTES # BLD AUTO: 0.01 K/UL (ref 0–0.11)
IMM GRANULOCYTES NFR BLD AUTO: 0.2 % (ref 0–0.9)
IRON SATN MFR SERPL: 23 % (ref 15–55)
IRON SERPL-MCNC: 77 UG/DL (ref 40–170)
LDLC SERPL CALC-MCNC: 69 MG/DL
LYMPHOCYTES # BLD AUTO: 2.01 K/UL (ref 1–4.8)
LYMPHOCYTES NFR BLD: 38 % (ref 22–41)
MCH RBC QN AUTO: 29.9 PG (ref 27–33)
MCHC RBC AUTO-ENTMCNC: 32.9 G/DL (ref 33.6–35)
MCV RBC AUTO: 90.8 FL (ref 81.4–97.8)
MONOCYTES # BLD AUTO: 0.22 K/UL (ref 0–0.85)
MONOCYTES NFR BLD AUTO: 4.2 % (ref 0–13.4)
NEUTROPHILS # BLD AUTO: 2.9 K/UL (ref 2–7.15)
NEUTROPHILS NFR BLD: 54.7 % (ref 44–72)
NRBC # BLD AUTO: 0 K/UL
NRBC BLD-RTO: 0 /100 WBC
PLATELET # BLD AUTO: 216 K/UL (ref 164–446)
PMV BLD AUTO: 11.5 FL (ref 9–12.9)
POTASSIUM SERPL-SCNC: 4.1 MMOL/L (ref 3.6–5.5)
PREALB SERPL-MCNC: 21.7 MG/DL (ref 18–38)
PROT SERPL-MCNC: 6.9 G/DL (ref 6–8.2)
RBC # BLD AUTO: 4.69 M/UL (ref 4.2–5.4)
RHEUMATOID FACT SER IA-ACNC: <10 IU/ML (ref 0–14)
SODIUM SERPL-SCNC: 139 MMOL/L (ref 135–145)
TIBC SERPL-MCNC: 339 UG/DL (ref 250–450)
TRIGL SERPL-MCNC: 129 MG/DL (ref 0–149)
UIBC SERPL-MCNC: 262 UG/DL (ref 110–370)
URATE SERPL-MCNC: 6.5 MG/DL (ref 1.9–8.2)
VIT B12 SERPL-MCNC: 492 PG/ML (ref 211–911)
WBC # BLD AUTO: 5.3 K/UL (ref 4.8–10.8)

## 2021-05-06 PROCEDURE — 83540 ASSAY OF IRON: CPT

## 2021-05-06 PROCEDURE — 86200 CCP ANTIBODY: CPT

## 2021-05-06 PROCEDURE — 36415 COLL VENOUS BLD VENIPUNCTURE: CPT

## 2021-05-06 PROCEDURE — 82306 VITAMIN D 25 HYDROXY: CPT

## 2021-05-06 PROCEDURE — 85652 RBC SED RATE AUTOMATED: CPT

## 2021-05-06 PROCEDURE — 85025 COMPLETE CBC W/AUTO DIFF WBC: CPT

## 2021-05-06 PROCEDURE — 84550 ASSAY OF BLOOD/URIC ACID: CPT

## 2021-05-06 PROCEDURE — 82746 ASSAY OF FOLIC ACID SERUM: CPT

## 2021-05-06 PROCEDURE — 86431 RHEUMATOID FACTOR QUANT: CPT

## 2021-05-06 PROCEDURE — 80061 LIPID PANEL: CPT

## 2021-05-06 PROCEDURE — 83036 HEMOGLOBIN GLYCOSYLATED A1C: CPT

## 2021-05-06 PROCEDURE — 82607 VITAMIN B-12: CPT

## 2021-05-06 PROCEDURE — 84425 ASSAY OF VITAMIN B-1: CPT

## 2021-05-06 PROCEDURE — 86038 ANTINUCLEAR ANTIBODIES: CPT

## 2021-05-06 PROCEDURE — 86140 C-REACTIVE PROTEIN: CPT

## 2021-05-06 PROCEDURE — 83550 IRON BINDING TEST: CPT

## 2021-05-06 PROCEDURE — 80053 COMPREHEN METABOLIC PANEL: CPT

## 2021-05-06 PROCEDURE — 84134 ASSAY OF PREALBUMIN: CPT

## 2021-05-08 LAB — 25(OH)D3 SERPL-MCNC: 27 NG/ML (ref 30–80)

## 2021-05-09 LAB
CCP IGG SERPL-ACNC: 17 UNITS (ref 0–19)
NUCLEAR IGG SER QL IA: NORMAL

## 2021-05-10 LAB — VIT B1 BLD-MCNC: 77 NMOL/L (ref 70–180)

## 2021-05-18 ENCOUNTER — HOSPITAL ENCOUNTER (OUTPATIENT)
Dept: LAB | Facility: MEDICAL CENTER | Age: 41
End: 2021-05-18
Attending: NURSE PRACTITIONER
Payer: COMMERCIAL

## 2021-05-18 LAB — RHEUMATOID FACT SER IA-ACNC: <10 IU/ML (ref 0–14)

## 2021-05-18 PROCEDURE — 86431 RHEUMATOID FACTOR QUANT: CPT

## 2021-05-18 PROCEDURE — 36415 COLL VENOUS BLD VENIPUNCTURE: CPT

## 2021-05-18 PROCEDURE — 86038 ANTINUCLEAR ANTIBODIES: CPT

## 2021-05-18 PROCEDURE — 86160 COMPLEMENT ANTIGEN: CPT | Mod: 91

## 2021-05-18 PROCEDURE — 83088 ASSAY OF HISTAMINE: CPT

## 2021-05-18 PROCEDURE — 83520 IMMUNOASSAY QUANT NOS NONAB: CPT

## 2021-05-19 ENCOUNTER — HOSPITAL ENCOUNTER (OUTPATIENT)
Facility: MEDICAL CENTER | Age: 41
End: 2021-05-19
Attending: NURSE PRACTITIONER
Payer: COMMERCIAL

## 2021-05-19 PROCEDURE — 82542 COL CHROMOTOGRAPHY QUAL/QUAN: CPT

## 2021-05-19 PROCEDURE — 36415 COLL VENOUS BLD VENIPUNCTURE: CPT

## 2021-05-20 LAB
C3 SERPL-MCNC: 132 MG/DL (ref 88–201)
C4 SERPL-MCNC: 31 MG/DL (ref 10–40)
NUCLEAR IGG SER QL IA: NORMAL
RHEUMATOID FACT SER NEPH-ACNC: <10 IU/ML (ref 0–14)

## 2021-05-21 LAB
HISTAMINE SERPL-SCNC: 14 NMOL/L (ref 0–8)
TRYPTASE SERPL-MCNC: <2 UG/L

## 2021-05-26 LAB
COLLECT DURATION TIME UR: 24 H
CREAT UR-MCNC: 77 MG/DL
CREAT URINE MG DAY Q4367: 1232 MG/24H (ref 603–1783)
ME-HISTAMINE/CREAT 24H UR: 94 MCG/G CR (ref 30–200)
SPECIMEN VOL UR: 1600 ML

## 2021-05-28 LAB — MISCELLANEOUS LAB RESULT MISCLAB: NORMAL

## 2021-06-01 ENCOUNTER — HOSPITAL ENCOUNTER (OUTPATIENT)
Dept: LAB | Facility: MEDICAL CENTER | Age: 41
End: 2021-06-01
Attending: FAMILY MEDICINE
Payer: COMMERCIAL

## 2021-06-01 LAB
HCV AB SER QL: NORMAL
HIV 1+2 AB+HIV1 P24 AG SERPL QL IA: NORMAL
TREPONEMA PALLIDUM IGG+IGM AB [PRESENCE] IN SERUM OR PLASMA BY IMMUNOASSAY: NORMAL

## 2021-06-01 PROCEDURE — 36415 COLL VENOUS BLD VENIPUNCTURE: CPT

## 2021-06-01 PROCEDURE — 87389 HIV-1 AG W/HIV-1&-2 AB AG IA: CPT

## 2021-06-01 PROCEDURE — 86780 TREPONEMA PALLIDUM: CPT

## 2021-06-01 PROCEDURE — 86803 HEPATITIS C AB TEST: CPT

## 2022-06-24 ENCOUNTER — HOSPITAL ENCOUNTER (OUTPATIENT)
Dept: LAB | Facility: MEDICAL CENTER | Age: 42
End: 2022-06-24
Attending: FAMILY MEDICINE
Payer: COMMERCIAL

## 2022-06-24 LAB
25(OH)D3 SERPL-MCNC: 68 NG/ML (ref 30–100)
ALBUMIN SERPL BCP-MCNC: 3.9 G/DL (ref 3.2–4.9)
ALBUMIN/GLOB SERPL: 1.2 G/DL
ALP SERPL-CCNC: 69 U/L (ref 30–99)
ALT SERPL-CCNC: 12 U/L (ref 2–50)
ANION GAP SERPL CALC-SCNC: 12 MMOL/L (ref 7–16)
AST SERPL-CCNC: 14 U/L (ref 12–45)
BASOPHILS # BLD AUTO: 0.6 % (ref 0–1.8)
BASOPHILS # BLD: 0.04 K/UL (ref 0–0.12)
BILIRUB SERPL-MCNC: 0.4 MG/DL (ref 0.1–1.5)
BUN SERPL-MCNC: 13 MG/DL (ref 8–22)
CALCIUM SERPL-MCNC: 9.2 MG/DL (ref 8.5–10.5)
CHLORIDE SERPL-SCNC: 101 MMOL/L (ref 96–112)
CHOLEST SERPL-MCNC: 189 MG/DL (ref 100–199)
CO2 SERPL-SCNC: 24 MMOL/L (ref 20–33)
CREAT SERPL-MCNC: 0.8 MG/DL (ref 0.5–1.4)
EOSINOPHIL # BLD AUTO: 0.07 K/UL (ref 0–0.51)
EOSINOPHIL NFR BLD: 1.1 % (ref 0–6.9)
ERYTHROCYTE [DISTWIDTH] IN BLOOD BY AUTOMATED COUNT: 41.1 FL (ref 35.9–50)
EST. AVERAGE GLUCOSE BLD GHB EST-MCNC: 103 MG/DL
FASTING STATUS PATIENT QL REPORTED: NORMAL
GFR SERPLBLD CREATININE-BSD FMLA CKD-EPI: 95 ML/MIN/1.73 M 2
GLOBULIN SER CALC-MCNC: 3.2 G/DL (ref 1.9–3.5)
GLUCOSE SERPL-MCNC: 88 MG/DL (ref 65–99)
HBA1C MFR BLD: 5.2 % (ref 4–5.6)
HCT VFR BLD AUTO: 41 % (ref 37–47)
HDLC SERPL-MCNC: 60 MG/DL
HGB BLD-MCNC: 13.8 G/DL (ref 12–16)
IMM GRANULOCYTES # BLD AUTO: 0 K/UL (ref 0–0.11)
IMM GRANULOCYTES NFR BLD AUTO: 0 % (ref 0–0.9)
LDLC SERPL CALC-MCNC: 96 MG/DL
LYMPHOCYTES # BLD AUTO: 2.5 K/UL (ref 1–4.8)
LYMPHOCYTES NFR BLD: 38.6 % (ref 22–41)
MCH RBC QN AUTO: 29.6 PG (ref 27–33)
MCHC RBC AUTO-ENTMCNC: 33.7 G/DL (ref 33.6–35)
MCV RBC AUTO: 88 FL (ref 81.4–97.8)
MONOCYTES # BLD AUTO: 0.27 K/UL (ref 0–0.85)
MONOCYTES NFR BLD AUTO: 4.2 % (ref 0–13.4)
NEUTROPHILS # BLD AUTO: 3.6 K/UL (ref 2–7.15)
NEUTROPHILS NFR BLD: 55.5 % (ref 44–72)
NRBC # BLD AUTO: 0 K/UL
NRBC BLD-RTO: 0 /100 WBC
PLATELET # BLD AUTO: 219 K/UL (ref 164–446)
PMV BLD AUTO: 11.5 FL (ref 9–12.9)
POTASSIUM SERPL-SCNC: 4.2 MMOL/L (ref 3.6–5.5)
PROT SERPL-MCNC: 7.1 G/DL (ref 6–8.2)
RBC # BLD AUTO: 4.66 M/UL (ref 4.2–5.4)
SODIUM SERPL-SCNC: 137 MMOL/L (ref 135–145)
TRIGL SERPL-MCNC: 167 MG/DL (ref 0–149)
TSH SERPL DL<=0.005 MIU/L-ACNC: 1.77 UIU/ML (ref 0.38–5.33)
VIT B12 SERPL-MCNC: 911 PG/ML (ref 211–911)
WBC # BLD AUTO: 6.5 K/UL (ref 4.8–10.8)

## 2022-06-24 PROCEDURE — 80053 COMPREHEN METABOLIC PANEL: CPT

## 2022-06-24 PROCEDURE — 36415 COLL VENOUS BLD VENIPUNCTURE: CPT

## 2022-06-24 PROCEDURE — 82607 VITAMIN B-12: CPT

## 2022-06-24 PROCEDURE — 82306 VITAMIN D 25 HYDROXY: CPT

## 2022-06-24 PROCEDURE — 84443 ASSAY THYROID STIM HORMONE: CPT

## 2022-06-24 PROCEDURE — 83036 HEMOGLOBIN GLYCOSYLATED A1C: CPT

## 2022-06-24 PROCEDURE — 80061 LIPID PANEL: CPT

## 2022-06-24 PROCEDURE — 85025 COMPLETE CBC W/AUTO DIFF WBC: CPT

## 2023-12-01 ENCOUNTER — HOSPITAL ENCOUNTER (OUTPATIENT)
Dept: LAB | Facility: MEDICAL CENTER | Age: 43
End: 2023-12-01
Attending: STUDENT IN AN ORGANIZED HEALTH CARE EDUCATION/TRAINING PROGRAM
Payer: COMMERCIAL

## 2023-12-01 LAB
25(OH)D3 SERPL-MCNC: 41 NG/ML (ref 30–100)
ALBUMIN SERPL BCP-MCNC: 4.3 G/DL (ref 3.2–4.9)
ALBUMIN/GLOB SERPL: 1.5 G/DL
ALP SERPL-CCNC: 76 U/L (ref 30–99)
ALT SERPL-CCNC: 15 U/L (ref 2–50)
ANION GAP SERPL CALC-SCNC: 11 MMOL/L (ref 7–16)
AST SERPL-CCNC: 12 U/L (ref 12–45)
BASOPHILS # BLD AUTO: 0.5 % (ref 0–1.8)
BASOPHILS # BLD: 0.03 K/UL (ref 0–0.12)
BILIRUB SERPL-MCNC: 0.6 MG/DL (ref 0.1–1.5)
BUN SERPL-MCNC: 10 MG/DL (ref 8–22)
CALCIUM ALBUM COR SERPL-MCNC: 9.2 MG/DL (ref 8.5–10.5)
CALCIUM SERPL-MCNC: 9.4 MG/DL (ref 8.5–10.5)
CHLORIDE SERPL-SCNC: 106 MMOL/L (ref 96–112)
CHOLEST SERPL-MCNC: 188 MG/DL (ref 100–199)
CO2 SERPL-SCNC: 24 MMOL/L (ref 20–33)
CREAT SERPL-MCNC: 0.79 MG/DL (ref 0.5–1.4)
EOSINOPHIL # BLD AUTO: 0.06 K/UL (ref 0–0.51)
EOSINOPHIL NFR BLD: 1 % (ref 0–6.9)
ERYTHROCYTE [DISTWIDTH] IN BLOOD BY AUTOMATED COUNT: 41.3 FL (ref 35.9–50)
EST. AVERAGE GLUCOSE BLD GHB EST-MCNC: 111 MG/DL
FERRITIN SERPL-MCNC: 10.1 NG/ML (ref 10–291)
GFR SERPLBLD CREATININE-BSD FMLA CKD-EPI: 96 ML/MIN/1.73 M 2
GLOBULIN SER CALC-MCNC: 2.9 G/DL (ref 1.9–3.5)
GLUCOSE SERPL-MCNC: 96 MG/DL (ref 65–99)
HBA1C MFR BLD: 5.5 % (ref 4–5.6)
HCT VFR BLD AUTO: 41.1 % (ref 37–47)
HDLC SERPL-MCNC: 57 MG/DL
HGB BLD-MCNC: 13.3 G/DL (ref 12–16)
IMM GRANULOCYTES # BLD AUTO: 0.01 K/UL (ref 0–0.11)
IMM GRANULOCYTES NFR BLD AUTO: 0.2 % (ref 0–0.9)
IRON SATN MFR SERPL: 28 % (ref 15–55)
IRON SERPL-MCNC: 115 UG/DL (ref 40–170)
LDLC SERPL CALC-MCNC: 100 MG/DL
LYMPHOCYTES # BLD AUTO: 2.07 K/UL (ref 1–4.8)
LYMPHOCYTES NFR BLD: 34.3 % (ref 22–41)
MCH RBC QN AUTO: 28.4 PG (ref 27–33)
MCHC RBC AUTO-ENTMCNC: 32.4 G/DL (ref 32.2–35.5)
MCV RBC AUTO: 87.8 FL (ref 81.4–97.8)
MONOCYTES # BLD AUTO: 0.27 K/UL (ref 0–0.85)
MONOCYTES NFR BLD AUTO: 4.5 % (ref 0–13.4)
NEUTROPHILS # BLD AUTO: 3.6 K/UL (ref 1.82–7.42)
NEUTROPHILS NFR BLD: 59.5 % (ref 44–72)
NRBC # BLD AUTO: 0 K/UL
NRBC BLD-RTO: 0 /100 WBC (ref 0–0.2)
PLATELET # BLD AUTO: 249 K/UL (ref 164–446)
PMV BLD AUTO: 11.5 FL (ref 9–12.9)
POTASSIUM SERPL-SCNC: 3.9 MMOL/L (ref 3.6–5.5)
PROT SERPL-MCNC: 7.2 G/DL (ref 6–8.2)
RBC # BLD AUTO: 4.68 M/UL (ref 4.2–5.4)
SODIUM SERPL-SCNC: 141 MMOL/L (ref 135–145)
TIBC SERPL-MCNC: 408 UG/DL (ref 250–450)
TRIGL SERPL-MCNC: 153 MG/DL (ref 0–149)
TSH SERPL DL<=0.005 MIU/L-ACNC: 1.26 UIU/ML (ref 0.38–5.33)
UIBC SERPL-MCNC: 293 UG/DL (ref 110–370)
VIT B12 SERPL-MCNC: 827 PG/ML (ref 211–911)
WBC # BLD AUTO: 6 K/UL (ref 4.8–10.8)

## 2023-12-01 PROCEDURE — 84443 ASSAY THYROID STIM HORMONE: CPT

## 2023-12-01 PROCEDURE — 36415 COLL VENOUS BLD VENIPUNCTURE: CPT

## 2023-12-01 PROCEDURE — 82728 ASSAY OF FERRITIN: CPT

## 2023-12-01 PROCEDURE — 83540 ASSAY OF IRON: CPT

## 2023-12-01 PROCEDURE — 82607 VITAMIN B-12: CPT

## 2023-12-01 PROCEDURE — 83036 HEMOGLOBIN GLYCOSYLATED A1C: CPT

## 2023-12-01 PROCEDURE — 85025 COMPLETE CBC W/AUTO DIFF WBC: CPT

## 2023-12-01 PROCEDURE — 80053 COMPREHEN METABOLIC PANEL: CPT

## 2023-12-01 PROCEDURE — 82306 VITAMIN D 25 HYDROXY: CPT

## 2023-12-01 PROCEDURE — 80061 LIPID PANEL: CPT

## 2023-12-01 PROCEDURE — 83550 IRON BINDING TEST: CPT

## 2024-01-18 DIAGNOSIS — E61.1 LOW IRON: ICD-10-CM

## 2024-01-18 RX ORDER — EPINEPHRINE 1 MG/ML(1)
0.5 AMPUL (ML) INJECTION PRN
OUTPATIENT
Start: 2024-01-18

## 2024-01-18 RX ORDER — METHYLPREDNISOLONE SODIUM SUCCINATE 125 MG/2ML
125 INJECTION, POWDER, LYOPHILIZED, FOR SOLUTION INTRAMUSCULAR; INTRAVENOUS PRN
OUTPATIENT
Start: 2024-01-18

## 2024-01-18 RX ORDER — DIPHENHYDRAMINE HYDROCHLORIDE 50 MG/ML
50 INJECTION INTRAMUSCULAR; INTRAVENOUS PRN
OUTPATIENT
Start: 2024-01-18

## 2024-01-18 RX ORDER — SODIUM CHLORIDE 9 MG/ML
INJECTION, SOLUTION INTRAVENOUS CONTINUOUS
OUTPATIENT
Start: 2024-01-18

## 2024-02-08 ENCOUNTER — HOSPITAL ENCOUNTER (OUTPATIENT)
Dept: LAB | Facility: MEDICAL CENTER | Age: 44
End: 2024-02-08
Attending: CLINICAL NURSE SPECIALIST
Payer: COMMERCIAL

## 2024-02-08 LAB
25(OH)D3 SERPL-MCNC: 36 NG/ML (ref 30–100)
ALBUMIN SERPL BCP-MCNC: 4.1 G/DL (ref 3.2–4.9)
ALBUMIN/GLOB SERPL: 1.3 G/DL
ALP SERPL-CCNC: 80 U/L (ref 30–99)
ALT SERPL-CCNC: 14 U/L (ref 2–50)
ANION GAP SERPL CALC-SCNC: 12 MMOL/L (ref 7–16)
AST SERPL-CCNC: 14 U/L (ref 12–45)
BASOPHILS # BLD AUTO: 0.7 % (ref 0–1.8)
BASOPHILS # BLD: 0.04 K/UL (ref 0–0.12)
BILIRUB SERPL-MCNC: 0.5 MG/DL (ref 0.1–1.5)
BUN SERPL-MCNC: 10 MG/DL (ref 8–22)
CALCIUM ALBUM COR SERPL-MCNC: 9.3 MG/DL (ref 8.5–10.5)
CALCIUM SERPL-MCNC: 9.4 MG/DL (ref 8.5–10.5)
CHLORIDE SERPL-SCNC: 104 MMOL/L (ref 96–112)
CHOLEST SERPL-MCNC: 170 MG/DL (ref 100–199)
CO2 SERPL-SCNC: 24 MMOL/L (ref 20–33)
CREAT SERPL-MCNC: 0.77 MG/DL (ref 0.5–1.4)
EOSINOPHIL # BLD AUTO: 0.08 K/UL (ref 0–0.51)
EOSINOPHIL NFR BLD: 1.5 % (ref 0–6.9)
ERYTHROCYTE [DISTWIDTH] IN BLOOD BY AUTOMATED COUNT: 41.1 FL (ref 35.9–50)
FASTING STATUS PATIENT QL REPORTED: NORMAL
FERRITIN SERPL-MCNC: 15.6 NG/ML (ref 10–291)
FOLATE SERPL-MCNC: 9.9 NG/ML
GFR SERPLBLD CREATININE-BSD FMLA CKD-EPI: 98 ML/MIN/1.73 M 2
GLOBULIN SER CALC-MCNC: 3.1 G/DL (ref 1.9–3.5)
GLUCOSE SERPL-MCNC: 83 MG/DL (ref 65–99)
HCT VFR BLD AUTO: 40.1 % (ref 37–47)
HDLC SERPL-MCNC: 63 MG/DL
HGB BLD-MCNC: 12.9 G/DL (ref 12–16)
IMM GRANULOCYTES # BLD AUTO: 0.01 K/UL (ref 0–0.11)
IMM GRANULOCYTES NFR BLD AUTO: 0.2 % (ref 0–0.9)
IRON SATN MFR SERPL: 17 % (ref 15–55)
IRON SERPL-MCNC: 68 UG/DL (ref 40–170)
LDLC SERPL CALC-MCNC: 84 MG/DL
LYMPHOCYTES # BLD AUTO: 2.09 K/UL (ref 1–4.8)
LYMPHOCYTES NFR BLD: 38.8 % (ref 22–41)
MCH RBC QN AUTO: 28 PG (ref 27–33)
MCHC RBC AUTO-ENTMCNC: 32.2 G/DL (ref 32.2–35.5)
MCV RBC AUTO: 87.2 FL (ref 81.4–97.8)
MONOCYTES # BLD AUTO: 0.22 K/UL (ref 0–0.85)
MONOCYTES NFR BLD AUTO: 4.1 % (ref 0–13.4)
NEUTROPHILS # BLD AUTO: 2.94 K/UL (ref 1.82–7.42)
NEUTROPHILS NFR BLD: 54.7 % (ref 44–72)
NRBC # BLD AUTO: 0 K/UL
NRBC BLD-RTO: 0 /100 WBC (ref 0–0.2)
PLATELET # BLD AUTO: 278 K/UL (ref 164–446)
PMV BLD AUTO: 11.2 FL (ref 9–12.9)
POTASSIUM SERPL-SCNC: 4.1 MMOL/L (ref 3.6–5.5)
PROT SERPL-MCNC: 7.2 G/DL (ref 6–8.2)
RBC # BLD AUTO: 4.6 M/UL (ref 4.2–5.4)
SODIUM SERPL-SCNC: 140 MMOL/L (ref 135–145)
TIBC SERPL-MCNC: 389 UG/DL (ref 250–450)
TRIGL SERPL-MCNC: 116 MG/DL (ref 0–149)
UIBC SERPL-MCNC: 321 UG/DL (ref 110–370)
VIT B12 SERPL-MCNC: 777 PG/ML (ref 211–911)
WBC # BLD AUTO: 5.4 K/UL (ref 4.8–10.8)

## 2024-02-08 PROCEDURE — 83540 ASSAY OF IRON: CPT

## 2024-02-08 PROCEDURE — 36415 COLL VENOUS BLD VENIPUNCTURE: CPT

## 2024-02-08 PROCEDURE — 82607 VITAMIN B-12: CPT

## 2024-02-08 PROCEDURE — 83550 IRON BINDING TEST: CPT

## 2024-02-08 PROCEDURE — 82306 VITAMIN D 25 HYDROXY: CPT

## 2024-02-08 PROCEDURE — 80053 COMPREHEN METABOLIC PANEL: CPT

## 2024-02-08 PROCEDURE — 82728 ASSAY OF FERRITIN: CPT

## 2024-02-08 PROCEDURE — 80061 LIPID PANEL: CPT

## 2024-02-08 PROCEDURE — 85025 COMPLETE CBC W/AUTO DIFF WBC: CPT

## 2024-02-08 PROCEDURE — 82746 ASSAY OF FOLIC ACID SERUM: CPT

## 2024-02-08 PROCEDURE — 84425 ASSAY OF VITAMIN B-1: CPT

## 2024-02-12 LAB — VIT B1 BLD-MCNC: 89 NMOL/L (ref 70–180)

## (undated) DEVICE — TROCAR 5X100 NON BLADED Z-TH - READ KII (6/BX)

## (undated) DEVICE — PACK UPPER EXTREMITY SM OR - (3/CA)

## (undated) DEVICE — SUTURE 4-0 ETHILON PS-2 18 (12PK/BX)"

## (undated) DEVICE — PAD PREP 24 X 48 CUFFED - (100/CA)

## (undated) DEVICE — ANTI-FOG SOLUTION - 60BTL/CA

## (undated) DEVICE — HUMID-VENT HEAT AND MOISTURE EXCHANGE- (50/BX)

## (undated) DEVICE — CHLORAPREP 26 ML APPLICATOR - ORANGE TINT(25/CA)

## (undated) DEVICE — SODIUM CHL IRRIGATION 0.9% 1000ML (12EA/CA)

## (undated) DEVICE — MASK ANESTHESIA ADULT  - (100/CA)

## (undated) DEVICE — DRESSING TRANSPARENT FILM TEGADERM 2.375 X 2.75"  (100EA/BX)"

## (undated) DEVICE — GLOVE BIOGEL M SZ 8 SURGICAL PF LTX - (50/BX 4BX/CA)

## (undated) DEVICE — GLOVE BIOGEL PI INDICATOR SZ 8.0 SURGICAL PF LF -(50/BX 4BX/CA)

## (undated) DEVICE — HEAD HOLDER JUNIOR/ADULT

## (undated) DEVICE — LACTATED RINGERS INJ 1000 ML - (14EA/CA 60CA/PF)

## (undated) DEVICE — GLOVE BIOGEL INDICATOR SZ 8 SURGICAL PF LTX - (50/BX 4BX/CA)

## (undated) DEVICE — TROCAR SEPARATOR 15MMZTHREAD - (6/BX)

## (undated) DEVICE — BAG SPONGE COUNT 10.25 X 32 - BLUE (250/CA)

## (undated) DEVICE — NEEDLE INSFL 120MM 14GA VRRS - (20/BX)

## (undated) DEVICE — GLOVE BIOGEL PI INDICATOR SZ 7.0 SURGICAL PF LF - (50/BX 4BX/CA)

## (undated) DEVICE — TUBING CLEARLINK DUO-VENT - C-FLO (48EA/CA)

## (undated) DEVICE — SUTURE 4-0 VICRYL PLUS FS-2 - 27 INCH (36/BX)

## (undated) DEVICE — KIT ANESTHESIA W/CIRCUIT & 3/LT BAG W/FILTER (20EA/CA)

## (undated) DEVICE — RELOAD WITH GRIPPING SURGACE TECHNOLOGY GREEN 60MM (12EA/BX)

## (undated) DEVICE — BLADE SURGICAL #15 - (50/BX 3BX/CA)

## (undated) DEVICE — GLOVE BIOGEL INDICATOR SZ 7SURGICAL PF LTX - (50/BX 4BX/CA)

## (undated) DEVICE — GLOVE BIOGEL SZ 7.5 SURGICAL PF LTX - (50PR/BX 4BX/CA)

## (undated) DEVICE — SUTURE GENERAL

## (undated) DEVICE — GLOVE SZ 6.5 BIOGEL PI MICRO - PF LF (50PR/BX)

## (undated) DEVICE — CANISTER SUCTION 3000ML MECHANICAL FILTER AUTO SHUTOFF MEDI-VAC NONSTERILE LF DISP  (40EA/CA)

## (undated) DEVICE — TUBING LAPAROSCOPIC PLUME DEVICE (10EA/CA)

## (undated) DEVICE — ELECTRODE DUAL RETURN W/ CORD - (50/PK)

## (undated) DEVICE — SET LEADWIRE 5 LEAD BEDSIDE DISPOSABLE ECG (1SET OF 5/EA)

## (undated) DEVICE — GLOVE SZ 7 BIOGEL PI MICRO - PF LF (50PR/BX 4BX/CA)

## (undated) DEVICE — PACK GASTRIC BANDING OR - (1/CA)

## (undated) DEVICE — PADDING CAST 4 IN STERILE - 4 X 4 YDS (24/CA)

## (undated) DEVICE — DRAPE LARGE 3 QUARTER - (20/CA)

## (undated) DEVICE — TROCAR Z THREAD12MM OPTICAL - NON BLADED (6/BX)

## (undated) DEVICE — ELECTRODE 850 FOAM ADHESIVE - HYDROGEL RADIOTRNSPRNT (50/PK)

## (undated) DEVICE — NEPTUNE 4 PORT MANIFOLD - (20/PK)

## (undated) DEVICE — STAPLER POWERED 60MM (3EA/BX)

## (undated) DEVICE — TOURNIQUET CUFF 18 X 3 ONE PORT DISP - STERILE (10/BX)

## (undated) DEVICE — WRAP CO-FLEX 4IN X 5YD STERIL - SELF-ADHERENT (18/CA)

## (undated) DEVICE — SENSOR SPO2 NEO LNCS ADHESIVE (20/BX) SEE USER NOTES

## (undated) DEVICE — SET SUCTION/IRRIGATION WITH DISPOSABLE TIP (6/CA )PART #0250-070-520 IS A SUB

## (undated) DEVICE — GOWN WARMING X-LARGE FLEX - (20/CA)

## (undated) DEVICE — RELOAD WITH GRIPPING SURFACE TECHNOLOGY GOLD 60MM (12EA/BX)

## (undated) DEVICE — SEALER VESSEL HARMONIC ACE PLUS WITH ADVANCED HEMOSTASIS 36CM (1/EA)

## (undated) DEVICE — GLOVE, LITE (PAIR)

## (undated) DEVICE — SPONGE GAUZESTER. 2X2 4-PL - (2/PK 50PK/BX 30BX/CS)

## (undated) DEVICE — SUTURE 3-0 ENDO STITCH ABSORBALE 8 20CM (6EA/CA)"

## (undated) DEVICE — PROTECTOR ULNA NERVE - (36PR/CA)

## (undated) DEVICE — KIT ROOM DECONTAMINATION

## (undated) DEVICE — GLOVE BIOGEL SZ 8 SURGICAL PF LTX - (50PR/BX 4BX/CA)

## (undated) DEVICE — SLEEVE, VASO, REPROC, LARGE

## (undated) DEVICE — DRESSING TRANSPARENT FILM TEGADERM 4 X 4.75" (50EA/BX)"

## (undated) DEVICE — ENDOSTITCH10MM SUTURING DEVIC - (3/CA)

## (undated) DEVICE — CANNULA W/SEAL 5X100 Z-THRE - ADED KII (12/BX)

## (undated) DEVICE — SET EXTENSION WITH 2 PORTS (48EA/CA) ***PART #2C8610 IS A SUBSTITUTE*****

## (undated) DEVICE — GLOVE BIOGEL SZ 6.5 SURGICAL PF LTX (50PR/BX 4BX/CA)

## (undated) DEVICE — CANNULA W/ SUPPLY TUBING O2 - (50/CA)

## (undated) DEVICE — SUCTION INSTRUMENT YANKAUER BULBOUS TIP W/O VENT (50EA/CA)

## (undated) DEVICE — BLADE BEAVER 6400 MINI EYE ROUND TIP SHARP ON ONE SIDE (20/CA)